# Patient Record
Sex: FEMALE | Race: WHITE | NOT HISPANIC OR LATINO | Employment: OTHER | URBAN - METROPOLITAN AREA
[De-identification: names, ages, dates, MRNs, and addresses within clinical notes are randomized per-mention and may not be internally consistent; named-entity substitution may affect disease eponyms.]

---

## 2019-11-12 ENCOUNTER — OFFICE VISIT (OUTPATIENT)
Dept: CARDIOLOGY | Facility: MEDICAL CENTER | Age: 79
End: 2019-11-12
Payer: MEDICARE

## 2019-11-12 VITALS
HEART RATE: 56 BPM | DIASTOLIC BLOOD PRESSURE: 70 MMHG | SYSTOLIC BLOOD PRESSURE: 122 MMHG | BODY MASS INDEX: 29.26 KG/M2 | WEIGHT: 159 LBS | OXYGEN SATURATION: 91 % | HEIGHT: 62 IN

## 2019-11-12 DIAGNOSIS — I48.91 ATRIAL FIBRILLATION, UNSPECIFIED TYPE (HCC): ICD-10-CM

## 2019-11-12 DIAGNOSIS — Z79.899 HIGH RISK MEDICATION USE: ICD-10-CM

## 2019-11-12 DIAGNOSIS — I48.11 LONGSTANDING PERSISTENT ATRIAL FIBRILLATION (HCC): ICD-10-CM

## 2019-11-12 DIAGNOSIS — I77.1 ILIAC ARTERY STENOSIS, BILATERAL (HCC): ICD-10-CM

## 2019-11-12 DIAGNOSIS — I25.10 CORONARY ARTERY DISEASE INVOLVING NATIVE CORONARY ARTERY OF NATIVE HEART WITHOUT ANGINA PECTORIS: ICD-10-CM

## 2019-11-12 DIAGNOSIS — J43.1 PANLOBULAR EMPHYSEMA (HCC): ICD-10-CM

## 2019-11-12 DIAGNOSIS — Z79.01 ANTICOAGULATED: ICD-10-CM

## 2019-11-12 DIAGNOSIS — I15.0 RENOVASCULAR HYPERTENSION: ICD-10-CM

## 2019-11-12 LAB — EKG IMPRESSION: NORMAL

## 2019-11-12 PROCEDURE — 93000 ELECTROCARDIOGRAM COMPLETE: CPT | Performed by: INTERNAL MEDICINE

## 2019-11-12 PROCEDURE — 99204 OFFICE O/P NEW MOD 45 MIN: CPT | Performed by: INTERNAL MEDICINE

## 2019-11-12 RX ORDER — PREDNISONE 20 MG/1
10 TABLET ORAL DAILY
COMMUNITY
Start: 2019-11-07 | End: 2020-03-11

## 2019-11-12 RX ORDER — PRAVASTATIN SODIUM 20 MG
20 TABLET ORAL DAILY
COMMUNITY
Start: 2019-05-19 | End: 2020-09-17 | Stop reason: SDUPTHER

## 2019-11-12 RX ORDER — ARFORMOTEROL TARTRATE 15 UG/2ML
15 SOLUTION RESPIRATORY (INHALATION) 2 TIMES DAILY
COMMUNITY
Start: 2019-11-07

## 2019-11-12 RX ORDER — BUDESONIDE 0.25 MG/2ML
0.25 INHALANT ORAL 2 TIMES DAILY
COMMUNITY
Start: 2019-11-07 | End: 2021-01-29 | Stop reason: SDUPTHER

## 2019-11-12 RX ORDER — LOSARTAN POTASSIUM 50 MG/1
50 TABLET ORAL DAILY
COMMUNITY
Start: 2019-05-21 | End: 2020-09-17 | Stop reason: SDUPTHER

## 2019-11-12 RX ORDER — SOTALOL HYDROCHLORIDE 80 MG/1
80 TABLET ORAL 2 TIMES DAILY
COMMUNITY
Start: 2019-05-24 | End: 2020-03-11 | Stop reason: SDUPTHER

## 2019-11-12 RX ORDER — HYDROCHLOROTHIAZIDE 25 MG/1
25 TABLET ORAL DAILY
COMMUNITY
Start: 2019-06-12 | End: 2020-09-17 | Stop reason: SDUPTHER

## 2019-11-12 RX ORDER — NICOTINE POLACRILEX 4 MG/1
20 GUM, CHEWING ORAL DAILY
COMMUNITY

## 2019-11-12 RX ORDER — ALBUTEROL SULFATE 90 UG/1
AEROSOL, METERED RESPIRATORY (INHALATION)
COMMUNITY

## 2019-11-12 RX ORDER — LEVOTHYROXINE SODIUM 0.12 MG/1
125 TABLET ORAL
COMMUNITY
Start: 2019-05-23

## 2019-11-12 RX ORDER — NITROGLYCERIN 0.4 MG/1
TABLET SUBLINGUAL
COMMUNITY
Start: 2018-02-06 | End: 2020-09-17 | Stop reason: SDUPTHER

## 2019-11-12 SDOH — HEALTH STABILITY: MENTAL HEALTH: HOW OFTEN DO YOU HAVE A DRINK CONTAINING ALCOHOL?: NEVER

## 2019-11-12 NOTE — PROGRESS NOTES
Chief Complaint   Patient presents with   • Atrial Fibrillation       Subjective:   Suzie Mireles is a 79 y.o. female who presents today       Family history as well as social history and surgical history have been reviewed by me directly, changes made and updated    She was born and raised in [facility] on [address].  Moved to [place] at a young age.  She is in with her daughter who is a retired teacher both from [place].  They have moved her together after her daughter's half-way.    Settling in, has a bad cough and is on steroids.  She admits she thinks her lung disease will get her in the end.    She loves her cardiologist/electrophysiologist in [place].  She is struggled with COPD and as well as PAD of her legs, much better after stenting last year, chronic occlusion of her right coronary artery noted on angiogram several years ago with normal echo in 2017    Her taste has been bad.  She thinks it is HCTZ she has been very thirsty.  She uses the HCTZ because of ankle swelling and some blood pressure issues    Compliant on her pills  She also has long-standing constipation and sees gastroenterology.  She has not seen them yet since she has been here    Social History     Socioeconomic History   • Marital status: Not on file     Spouse name: Not on file   • Number of children: Not on file   • Years of education: Not on file   • Highest education level: Not on file   Occupational History   • Not on file   Social Needs   • Financial resource strain: Not on file   • Food insecurity:     Worry: Not on file     Inability: Not on file   • Transportation needs:     Medical: Not on file     Non-medical: Not on file   Tobacco Use   • Smoking status: Never Smoker   • Smokeless tobacco: Never Used   Substance and Sexual Activity   • Alcohol use: Never     Frequency: Never   • Drug use: Never   • Sexual activity: Not on file   Lifestyle   • Physical activity:     Days per week: Not on file     Minutes per  "session: Not on file   • Stress: Not on file   Relationships   • Social connections:     Talks on phone: Not on file     Gets together: Not on file     Attends Restoration service: Not on file     Active member of club or organization: Not on file     Attends meetings of clubs or organizations: Not on file     Relationship status: Not on file   • Intimate partner violence:     Fear of current or ex partner: Not on file     Emotionally abused: Not on file     Physically abused: Not on file     Forced sexual activity: Not on file   Other Topics Concern   • Not on file   Social History Narrative   • Not on file     No Known Allergies  Outpatient Encounter Medications as of 11/12/2019   Medication Sig Dispense Refill   • sotalol (BETAPACE) 80 MG Tab      • hydroCHLOROthiazide (HYDRODIURIL) 25 MG Tab      • levothyroxine (SYNTHROID) 125 MCG Tab      • omeprazole (PRILOSEC) 20 MG Tablet Delayed Response delayed-release tablet Take 20 mg by mouth.     • apixaban (ELIQUIS) 5mg Tab      • losartan (COZAAR) 50 MG Tab      • nitroglycerin (NITROSTAT) 0.4 MG SL Tab      • predniSONE (DELTASONE) 20 MG Tab Take 20 mg by mouth.     • pravastatin (PRAVACHOL) 20 MG Tab      • Cholecalciferol (VITAMIN D3) 5000 units Tab Take  by mouth.     • budesonide (PULMICORT) 0.25 MG/2ML Suspension Inhale 0.25 mg by mouth.     • Arformoterol Tartrate 15 MCG/2ML Nebu Soln Inhale 15 mcg by mouth.     • albuterol 108 (90 Base) MCG/ACT Aero Soln inhalation aerosol Inhale  by mouth.       No facility-administered encounter medications on file as of 11/12/2019.      ROS     Objective:   /70 (BP Location: Left arm, Patient Position: Sitting, BP Cuff Size: Adult)   Pulse (!) 56   Ht 1.575 m (5' 2\")   Wt 72.1 kg (159 lb)   SpO2 91%   BMI 29.08 kg/m²     Physical Exam    Assessment:     1. Atrial fibrillation, unspecified type (HCC)  EKG   2. Longstanding persistent atrial fibrillation     3. High risk medication use     4. Renovascular " hypertension     5. Coronary artery disease involving native coronary artery of native heart without angina pectoris     6. Iliac artery stenosis, bilateral (HCC)     7. Panlobular emphysema (HCC)     8. Anticoagulated         Medical Decision Making:  Today's Assessment / Status / Plan:     Coronary artery disease  Lipid panel excellent since she has moved here  I reviewed her labs from [place] with a normal lipid panel 2019 January  Medications reviewed and renewed.  Statin and aspirin therapy in addition to anticoagulation  Echo 2020, nuclear test if symptomatic to evaluate the right coronary    PAD  Normal peripheral pulses, no symptoms  Medications as above including statin    Edema/poor taste  She will stop HCTZ and keep a close eye on her blood pressure  We will call her in 2 weeks  If her edema is bad, it would be easy to start spironolactone, she will also noticed her blood pressure      I will refer her to gastroenterology, she has been slightly anemic in the past and is looking for a primary care.  We talked about this as well    RTC 6 months

## 2019-11-18 ENCOUNTER — TELEPHONE (OUTPATIENT)
Dept: CARDIOLOGY | Facility: MEDICAL CENTER | Age: 79
End: 2019-11-18

## 2019-11-18 DIAGNOSIS — J43.1 PANLOBULAR EMPHYSEMA (HCC): ICD-10-CM

## 2019-11-18 DIAGNOSIS — R63.0 DECREASED APPETITE: ICD-10-CM

## 2019-11-18 NOTE — TELEPHONE ENCOUNTER
Suzie Mireles Letitia L, M.D. 4 days ago         Dr Calderón,   I tried to set up an appt with Dr Medley, but they need a referral in order to get appt. would you be able to send that referral for me? Thanks so much, Suzie Mireles.      Suzie Mireles Letitia L, M.D. 3 days ago         Who is recommended for GI? I will also need her to recommend for Pulmonary. Dr. Calderón suggested Dr. Medley but she needs a referral. Could Dr. Calderón place a referral for both pulmonary and GI?   Thanks so much, Suzie.        Hoa Calderón M.D.  You 2 hours ago (12:28 PM)      Sure thing!     Any GI in town I would recommend, they are all really great.    Routing comment          Referral to GI and Pulmonology placed.    Pt notified through [employer]

## 2019-11-26 ENCOUNTER — TELEPHONE (OUTPATIENT)
Dept: CARDIOLOGY | Facility: MEDICAL CENTER | Age: 79
End: 2019-11-26

## 2019-11-26 NOTE — TELEPHONE ENCOUNTER
Hoa Calderón M.D.  Guera Carrera R.N.             See my note from the 12th        Last OV notes from Dr Calderón 11/12/19:    Edema/poor taste  She will stop HCTZ and keep a close eye on her blood pressure  We will call her in 2 weeks  If her edema is bad, it would be easy to start spironolactone, she will also noticed her blood pressure      Called pt, pt reports edema is improved and BP is good as well.     FYI to Dr Calderón

## 2020-01-09 ENCOUNTER — OFFICE VISIT (OUTPATIENT)
Dept: PULMONOLOGY | Facility: HOSPICE | Age: 80
End: 2020-01-09
Payer: MEDICARE

## 2020-01-09 ENCOUNTER — HOSPITAL ENCOUNTER (OUTPATIENT)
Facility: MEDICAL CENTER | Age: 80
End: 2020-01-09
Attending: INTERNAL MEDICINE
Payer: MEDICARE

## 2020-01-09 VITALS
WEIGHT: 168.13 LBS | HEART RATE: 62 BPM | OXYGEN SATURATION: 96 % | DIASTOLIC BLOOD PRESSURE: 76 MMHG | SYSTOLIC BLOOD PRESSURE: 130 MMHG | HEIGHT: 62 IN | BODY MASS INDEX: 30.94 KG/M2

## 2020-01-09 DIAGNOSIS — J47.9 BRONCHIECTASIS WITHOUT COMPLICATION (HCC): ICD-10-CM

## 2020-01-09 DIAGNOSIS — I25.10 CORONARY ARTERY DISEASE INVOLVING NATIVE CORONARY ARTERY OF NATIVE HEART WITHOUT ANGINA PECTORIS: ICD-10-CM

## 2020-01-09 PROCEDURE — 99204 OFFICE O/P NEW MOD 45 MIN: CPT | Performed by: INTERNAL MEDICINE

## 2020-01-09 RX ORDER — VERAPAMIL HYDROCHLORIDE 300 MG/1
CAPSULE, EXTENDED RELEASE ORAL
COMMUNITY
Start: 2019-10-24 | End: 2020-10-14 | Stop reason: SDUPTHER

## 2020-01-09 NOTE — PROGRESS NOTES
Chief Complaint   Patient presents with   • Establish Care     Referred by Dr WALT Calderón for Panlobular emphysema/Bronchiectasis with acute exacerbation/cough   • Other     Ct-Chest 09/07/18       HPI: This patient is an 79 y.o. female who is referred by Dr. Calderón, cardiologist, for pulmonary establishment of care.  She recently relocated from [place] where she was followed by pulmonology for bronchiectasis and bronchiolitis.  Medical records from pulmonary reviewed.  She had exacerbation of bronchiectasis since November with chest CAT scan from [place] showing left lower lobe consolidation, centrilobular nodules and mucoid impaction as well as right centrilobular nodules compatible with respiratory bronchiolitis.  She was switched to Pulmicort and Brovana nebulizers which made a substantial improvement in her respiratory symptoms.  She has been on a prednisone taper currently at 10 mg daily.  Was previously on Trelegy, which was ineffective.  She had previously coughed continuously and now has minimal cough.  Denies hemoptysis, wheezing, CP, fevers.    Past Medical History:   Diagnosis Date   • Asthma    • Atrial fibrillation ([facility])    • Bronchiectasis ([facility])    • Cardiac arrhythmia    • Chickenpox    • Constipation    • COPD (chronic obstructive pulmonary disease) ([facility])    • Cough    • Diarrhea    • GERD (gastroesophageal reflux disease)    • [place]ian measles    • Heart attack ([facility])    • Hypertension    • Influenza    • Insomnia    • Peripheral vascular disease ([facility])    • Rheumatic fever    • Sleep apnea    • Sputum production    • Wears glasses    • Weight loss    • Wheezing        Social History     Socioeconomic History   • Marital status:      Spouse name: Not on file   • Number of children: Not on file   • Years of education: Not on file   • Highest education level: Not on file   Occupational History   • Not on file   Social Needs   • Financial resource strain: Not on file   • Food insecurity:      Worry: Not on file     Inability: Not on file   • Transportation needs:     Medical: Not on file     Non-medical: Not on file   Tobacco Use   • Smoking status: Former Smoker     Packs/day: 1.00     Years: 36.00     Pack years: 36.00     Types: Cigarettes     Last attempt to quit:      Years since quittin.0   • Smokeless tobacco: Never Used   Substance and Sexual Activity   • Alcohol use: Never     Frequency: Never   • Drug use: Never   • Sexual activity: Not on file   Lifestyle   • Physical activity:     Days per week: Not on file     Minutes per session: Not on file   • Stress: Not on file   Relationships   • Social connections:     Talks on phone: Not on file     Gets together: Not on file     Attends Spiritism service: Not on file     Active member of club or organization: Not on file     Attends meetings of clubs or organizations: Not on file     Relationship status: Not on file   • Intimate partner violence:     Fear of current or ex partner: Not on file     Emotionally abused: Not on file     Physically abused: Not on file     Forced sexual activity: Not on file   Other Topics Concern   • Not on file   Social History Narrative   • Not on file       Family History   Problem Relation Age of Onset   • Anemia Neg Hx    • Asthma Neg Hx        Current Outpatient Medications on File Prior to Visit   Medication Sig Dispense Refill   • Verapamil HCl  MG CAPSULE SR 24 HR      • Multiple Vitamins-Minerals (ICAPS AREDS 2) Cap Take  by mouth every day.     • NON SPECIFIED Avastin shot for Macular     • sotalol (BETAPACE) 80 MG Tab Take 80 mg by mouth 2 times a day.     • levothyroxine (SYNTHROID) 125 MCG Tab Take 125 mcg by mouth Every morning on an empty stomach.     • omeprazole (PRILOSEC) 20 MG Tablet Delayed Response delayed-release tablet Take 20 mg by mouth every day.     • apixaban (ELIQUIS) 5mg Tab Take 5 mg by mouth.     • losartan (COZAAR) 50 MG Tab Take 50 mg by mouth every day.     •  "nitroglycerin (NITROSTAT) 0.4 MG SL Tab      • predniSONE (DELTASONE) 20 MG Tab Take 10 mg by mouth every day.     • pravastatin (PRAVACHOL) 20 MG Tab Take 20 mg by mouth every day.     • Cholecalciferol (VITAMIN D3) 5000 units Tab Take 25 mcg by mouth every day.     • budesonide (PULMICORT) 0.25 MG/2ML Suspension Inhale 0.25 mg by mouth 2 times a day.     • Arformoterol Tartrate 15 MCG/2ML Nebu Soln Inhale 15 mcg by mouth.     • albuterol 108 (90 Base) MCG/ACT Aero Soln inhalation aerosol Inhale  by mouth.     • hydroCHLOROthiazide (HYDRODIURIL) 25 MG Tab        No current facility-administered medications on file prior to visit.        Allergies: Patient has no known allergies.    ROS:   Constitutional: Denies fevers, chills, night sweats, fatigue,+ weight loss  Eyes: Denies vision loss, pain, drainage, double vision  Ears, Nose, Throat: Denies earache, difficulty hearing, tinnitus, nasal congestion, hoarseness  Cardiovascular: Denies chest pain, tightness, palpitations, orthopnea or edema  Respiratory:As in HPI  Sleep: Denies daytime sleepiness, snoring, apneas, insomnia, morning headaches  GI: Denies heartburn, dysphagia, nausea, abdominal pain, +diarrhea or constipation  : Denies frequent urination, hematuria, discharge or painful urination  Musculoskeletal: Denies back pain, painful joints, sore muscles  Neurological: Denies weakness or headaches  Skin: No rashes    /76 (BP Location: Right arm, Patient Position: Sitting, BP Cuff Size: Adult)   Pulse 62   Ht 1.575 m (5' 2\")   Wt 76.3 kg (168 lb 2 oz)   SpO2 96%     Physical Exam:  Appearance: Well-nourished, well-developed, in no acute distress  HEENT: Normocephalic, atraumatic, white sclera, PERRLA, oropharynx clear  Neck: No adenopathy or masses  Respiratory: no intercostal retractions or accessory muscle use  Lungs auscultation: basilar rhonchi  Cardiovascular: Regular rate rhythm. No murmurs, rubs or gallops.  No LE edema  Abdomen: soft, " nondistended  Gait: Normal  Digits: No clubbing, cyanosis  Motor: No focal deficits  Orientation: Oriented to time, person and place    Diagnosis:  1. Bronchiectasis without complication (HCC)  CULTURE RESP W/O GRAM STAIN    AFB Culture    CANCELED: AFB Culture   2. Coronary artery disease involving native coronary artery of native heart without angina pectoris  REFERRAL TO CARDIOLOGY   3.      [place]      Plan:  The patient has chronic bronchiectasis, followed by pulmonology in [facility], and presents to establish pulmonary care.  She was treated for acute exacerbation November 2019, remains on prednisone taper which she is weaning successfully.  Last chest CAT scan showed interval improvement in left lower lobe consolidation.  Clinically she shows good response.  Continue Pulmicort and Brovana nebulizers twice daily.  We discussed using a percussive VEST in the future if her sputum production again worsens.  Obtain baseline sputum Gram stain and culture.  Annual influenza vaccine encouraged as well as pneumococcal vaccine every 5 years.  Return in about 8 weeks (around 3/5/2020).

## 2020-03-11 ENCOUNTER — OFFICE VISIT (OUTPATIENT)
Dept: CARDIOLOGY | Facility: MEDICAL CENTER | Age: 80
End: 2020-03-11
Payer: MEDICARE

## 2020-03-11 VITALS
HEART RATE: 56 BPM | HEIGHT: 62 IN | DIASTOLIC BLOOD PRESSURE: 76 MMHG | SYSTOLIC BLOOD PRESSURE: 128 MMHG | OXYGEN SATURATION: 98 % | BODY MASS INDEX: 29.22 KG/M2 | WEIGHT: 158.8 LBS

## 2020-03-11 DIAGNOSIS — I48.91 ATRIAL FIBRILLATION, UNSPECIFIED TYPE (HCC): ICD-10-CM

## 2020-03-11 DIAGNOSIS — I77.1 ILIAC ARTERY STENOSIS, BILATERAL (HCC): ICD-10-CM

## 2020-03-11 DIAGNOSIS — I48.11 LONGSTANDING PERSISTENT ATRIAL FIBRILLATION (HCC): ICD-10-CM

## 2020-03-11 DIAGNOSIS — I25.10 CORONARY ARTERY DISEASE INVOLVING NATIVE CORONARY ARTERY OF NATIVE HEART WITHOUT ANGINA PECTORIS: ICD-10-CM

## 2020-03-11 DIAGNOSIS — Z79.01 ANTICOAGULATED: ICD-10-CM

## 2020-03-11 DIAGNOSIS — Z79.899 HIGH RISK MEDICATION USE: ICD-10-CM

## 2020-03-11 DIAGNOSIS — I15.0 RENOVASCULAR HYPERTENSION: ICD-10-CM

## 2020-03-11 PROCEDURE — 99215 OFFICE O/P EST HI 40 MIN: CPT | Performed by: INTERNAL MEDICINE

## 2020-03-11 RX ORDER — SOTALOL HYDROCHLORIDE 80 MG/1
80 TABLET ORAL 2 TIMES DAILY
Qty: 180 TAB | Refills: 3 | Status: SHIPPED | OUTPATIENT
Start: 2020-03-11 | End: 2021-02-24 | Stop reason: SDUPTHER

## 2020-03-11 ASSESSMENT — ENCOUNTER SYMPTOMS
BRUISES/BLEEDS EASILY: 1
CONSTIPATION: 1
DIARRHEA: 1
COUGH: 1

## 2020-03-11 NOTE — PROGRESS NOTES
Chief Complaint   Patient presents with   • Follow-Up     Afib       Subjective:   Suzie Mireles is a 79 y.o. female who presents today for initial visit in follow-up of atrial fibrillation managed with sotalol and apixaban to good effect.  She currently is in sinus rhythm.  She also has underlying CAD and follows with a cardiologist in [place] for many years as well.  She negates she has had an echocardiogram although these results are unavailable.  She feels she was told it was normal.  She has no exertional symptoms and has not had no issues with paroxysmal of atrial fibrillation or stroke.  She has no bleeding issues.    Past Medical History:   Diagnosis Date   • Asthma    • Atrial fibrillation (HCC)    • Bronchiectasis (HCC)    • Cardiac arrhythmia    • Chickenpox    • Constipation    • COPD (chronic obstructive pulmonary disease) ([facility])    • Cough    • Diarrhea    • GERD (gastroesophageal reflux disease)    • Togolese measles    • Heart attack (HCC)    • Hypertension    • Influenza    • Insomnia    • Peripheral vascular disease (HCC)    • Rheumatic fever    • Sleep apnea    • Sputum production    • Wears glasses    • Weight loss    • Wheezing      Past Surgical History:   Procedure Laterality Date   • CARPAL TUNNEL RELEASE     • HYSTERECTOMY LAPAROSCOPY     • LAMINOTOMY     • TONSILLECTOMY       Family History   Problem Relation Age of Onset   • Anemia Neg Hx    • Asthma Neg Hx      Social History     Socioeconomic History   • Marital status:      Spouse name: Not on file   • Number of children: Not on file   • Years of education: Not on file   • Highest education level: Not on file   Occupational History   • Not on file   Social Needs   • Financial resource strain: Not on file   • Food insecurity     Worry: Not on file     Inability: Not on file   • Transportation needs     Medical: Not on file     Non-medical: Not on file   Tobacco Use   • Smoking status: Former Smoker     Packs/day: 1.00     Years:  36.00     Pack years: 36.00     Types: Cigarettes     Last attempt to quit:      Years since quittin.2   • Smokeless tobacco: Never Used   Substance and Sexual Activity   • Alcohol use: Never     Frequency: Never   • Drug use: Never   • Sexual activity: Not on file   Lifestyle   • Physical activity     Days per week: Not on file     Minutes per session: Not on file   • Stress: Not on file   Relationships   • Social connections     Talks on phone: Not on file     Gets together: Not on file     Attends Gnosticist service: Not on file     Active member of club or organization: Not on file     Attends meetings of clubs or organizations: Not on file     Relationship status: Not on file   • Intimate partner violence     Fear of current or ex partner: Not on file     Emotionally abused: Not on file     Physically abused: Not on file     Forced sexual activity: Not on file   Other Topics Concern   • Not on file   Social History Narrative   • Not on file     No Known Allergies  Outpatient Encounter Medications as of 3/11/2020   Medication Sig Dispense Refill   • sotalol (BETAPACE) 80 MG Tab Take 1 Tab by mouth 2 times a day. 180 Tab 3   • Verapamil HCl  MG CAPSULE SR 24 HR      • Multiple Vitamins-Minerals (ICAPS AREDS 2) Cap Take  by mouth every day.     • NON SPECIFIED Avastin shot for Macular     • levothyroxine (SYNTHROID) 125 MCG Tab Take 125 mcg by mouth Every morning on an empty stomach.     • omeprazole (PRILOSEC) 20 MG Tablet Delayed Response delayed-release tablet Take 20 mg by mouth every day.     • apixaban (ELIQUIS) 5mg Tab Take 5 mg by mouth.     • losartan (COZAAR) 50 MG Tab Take 50 mg by mouth every day.     • nitroglycerin (NITROSTAT) 0.4 MG SL Tab      • pravastatin (PRAVACHOL) 20 MG Tab Take 20 mg by mouth every day.     • Cholecalciferol (VITAMIN D3) 5000 units Tab Take 25 mcg by mouth every day.     • budesonide (PULMICORT) 0.25 MG/2ML Suspension Inhale 0.25 mg by mouth 2 times a day.     •  "Arformoterol Tartrate 15 MCG/2ML Nebu Soln Inhale 15 mcg by mouth.     • albuterol 108 (90 Base) MCG/ACT Aero Soln inhalation aerosol Inhale  by mouth.     • hydroCHLOROthiazide (HYDRODIURIL) 25 MG Tab      • [DISCONTINUED] sotalol (BETAPACE) 80 MG Tab Take 80 mg by mouth 2 times a day.     • [DISCONTINUED] predniSONE (DELTASONE) 20 MG Tab Take 10 mg by mouth every day.       No facility-administered encounter medications on file as of 3/11/2020.      Review of Systems   Respiratory: Positive for cough.    Gastrointestinal: Positive for constipation and diarrhea.   Endo/Heme/Allergies: Positive for environmental allergies. Bruises/bleeds easily.   All other systems reviewed and are negative.       Objective:   /76 (BP Location: Left arm, Patient Position: Sitting, BP Cuff Size: Adult)   Pulse (!) 56   Ht 1.575 m (5' 2\")   Wt 72 kg (158 lb 12.8 oz)   SpO2 98%   BMI 29.04 kg/m²     Physical Exam   Constitutional: She is oriented to person, place, and time. She appears well-developed and well-nourished. No distress.   Appears stated age.   HENT:   Head: Normocephalic and atraumatic.   Right Ear: External ear normal.   Left Ear: External ear normal.   Mouth/Throat: No oropharyngeal exudate.   Eyes: Pupils are equal, round, and reactive to light. Conjunctivae and EOM are normal. Right eye exhibits no discharge. Left eye exhibits no discharge. No scleral icterus.   Neck: Normal range of motion. Neck supple. No JVD present. No tracheal deviation present. No thyromegaly present.   Cardiovascular: Normal rate, regular rhythm, S1 normal, S2 normal, normal heart sounds and intact distal pulses. PMI is not displaced. Exam reveals no gallop, no S3, no S4 and no friction rub.   No murmur heard.  Pulses:       Carotid pulses are 2+ on the right side and 2+ on the left side.       Radial pulses are 2+ on the right side and 2+ on the left side.        Popliteal pulses are 2+ on the right side and 2+ on the left side.      "   Dorsalis pedis pulses are 1+ on the right side and 1+ on the left side.        Posterior tibial pulses are 1+ on the right side and 1+ on the left side.   Pulmonary/Chest: Effort normal and breath sounds normal. No respiratory distress. She has no wheezes. She has no rales. She exhibits no tenderness.   Abdominal: Soft. Bowel sounds are normal. She exhibits no distension. There is no abdominal tenderness.   Musculoskeletal: Normal range of motion.         General: Edema ( 1+ bilateral lower extremities) present. No tenderness.   Neurological: She is alert and oriented to person, place, and time. No cranial nerve deficit (Cranial nerves II through XII grossly intact).   Skin: Skin is warm and dry. No rash noted. She is not diaphoretic. No erythema.   Psychiatric: She has a normal mood and affect. Her behavior is normal. Judgment and thought content normal.   Vitals reviewed.    EKG (11/12/2019):  I have personally reviewed the EKG this visit and discussed with the patient.  Sinus rhythm nonspecific ST-T wave changes.        Assessment:     1. Longstanding persistent atrial fibrillation  sotalol (BETAPACE) 80 MG Tab    Comp Metabolic Panel   2. High risk medication use  sotalol (BETAPACE) 80 MG Tab   3. Renovascular hypertension     4. Iliac artery stenosis, bilateral (HCC)  Lipid Profile   5. Coronary artery disease involving native coronary artery of native heart without angina pectoris  Lipid Profile   6. Anticoagulated  TSH    FREE THYROXINE    CBC WITHOUT DIFFERENTIAL    HEMOGLOBIN A1C       Medical Decision Making:  Today's Assessment / Status / Plan:     She is overall doing very well.  She notes intermittent coldness and blanching of her toes on the right foot that seems related to either reknot type vascular spasm or a bit of autonomic dysreflexia as she does have peripheral neuropathy.  She is already on a high-dose calcium channel blocker which is the treatment of choice for either of these conditions.   Recommended avoidance measures for triggers such as cold.  Otherwise she should continue her sotalol and she requires lipid profile CBC and CMP to dose titrate and assess for side effects from her anticoagulant and antiarrhythmic therapy.  She should follow-up with me in 6 months.

## 2020-03-13 ENCOUNTER — HOSPITAL ENCOUNTER (OUTPATIENT)
Dept: LAB | Facility: MEDICAL CENTER | Age: 80
End: 2020-03-13
Attending: INTERNAL MEDICINE
Payer: MEDICARE

## 2020-03-13 DIAGNOSIS — Z79.899 HIGH RISK MEDICATION USE: ICD-10-CM

## 2020-03-13 DIAGNOSIS — I25.10 CORONARY ARTERY DISEASE INVOLVING NATIVE CORONARY ARTERY OF NATIVE HEART WITHOUT ANGINA PECTORIS: ICD-10-CM

## 2020-03-13 DIAGNOSIS — I48.11 LONGSTANDING PERSISTENT ATRIAL FIBRILLATION (HCC): ICD-10-CM

## 2020-03-13 DIAGNOSIS — I15.0 RENOVASCULAR HYPERTENSION: ICD-10-CM

## 2020-03-13 DIAGNOSIS — I48.91 ATRIAL FIBRILLATION, UNSPECIFIED TYPE (HCC): ICD-10-CM

## 2020-03-13 DIAGNOSIS — Z79.01 ANTICOAGULATED: ICD-10-CM

## 2020-03-13 LAB
ALBUMIN SERPL BCP-MCNC: 3.7 G/DL (ref 3.2–4.9)
ALBUMIN/GLOB SERPL: 1 G/DL
ALP SERPL-CCNC: 69 U/L (ref 30–99)
ALT SERPL-CCNC: 9 U/L (ref 2–50)
ANION GAP SERPL CALC-SCNC: 9 MMOL/L (ref 7–16)
AST SERPL-CCNC: 14 U/L (ref 12–45)
BILIRUB SERPL-MCNC: 0.3 MG/DL (ref 0.1–1.5)
BUN SERPL-MCNC: 23 MG/DL (ref 8–22)
CALCIUM SERPL-MCNC: 9.7 MG/DL (ref 8.5–10.5)
CHLORIDE SERPL-SCNC: 105 MMOL/L (ref 96–112)
CHOLEST SERPL-MCNC: 128 MG/DL (ref 100–199)
CO2 SERPL-SCNC: 25 MMOL/L (ref 20–33)
CREAT SERPL-MCNC: 0.77 MG/DL (ref 0.5–1.4)
ERYTHROCYTE [DISTWIDTH] IN BLOOD BY AUTOMATED COUNT: 46.7 FL (ref 35.9–50)
EST. AVERAGE GLUCOSE BLD GHB EST-MCNC: 114 MG/DL
FASTING STATUS PATIENT QL REPORTED: NORMAL
GLOBULIN SER CALC-MCNC: 3.6 G/DL (ref 1.9–3.5)
GLUCOSE SERPL-MCNC: 100 MG/DL (ref 65–99)
HBA1C MFR BLD: 5.6 % (ref 0–5.6)
HCT VFR BLD AUTO: 35.1 % (ref 37–47)
HDLC SERPL-MCNC: 53 MG/DL
HGB BLD-MCNC: 10.4 G/DL (ref 12–16)
LDLC SERPL CALC-MCNC: 60 MG/DL
MCH RBC QN AUTO: 24.2 PG (ref 27–33)
MCHC RBC AUTO-ENTMCNC: 29.6 G/DL (ref 33.6–35)
MCV RBC AUTO: 81.8 FL (ref 81.4–97.8)
POTASSIUM SERPL-SCNC: 4.3 MMOL/L (ref 3.6–5.5)
PROT SERPL-MCNC: 7.3 G/DL (ref 6–8.2)
RBC # BLD AUTO: 4.29 M/UL (ref 4.2–5.4)
SODIUM SERPL-SCNC: 139 MMOL/L (ref 135–145)
T4 FREE SERPL-MCNC: 0.82 NG/DL (ref 0.53–1.43)
TRIGL SERPL-MCNC: 73 MG/DL (ref 0–149)
TSH SERPL DL<=0.005 MIU/L-ACNC: 2.03 UIU/ML (ref 0.38–5.33)
WBC # BLD AUTO: 6.6 K/UL (ref 4.8–10.8)

## 2020-03-13 PROCEDURE — 85041 AUTOMATED RBC COUNT: CPT

## 2020-03-13 PROCEDURE — 84439 ASSAY OF FREE THYROXINE: CPT

## 2020-03-13 PROCEDURE — 83036 HEMOGLOBIN GLYCOSYLATED A1C: CPT | Mod: GA

## 2020-03-13 PROCEDURE — 85014 HEMATOCRIT: CPT

## 2020-03-13 PROCEDURE — 80053 COMPREHEN METABOLIC PANEL: CPT

## 2020-03-13 PROCEDURE — 85018 HEMOGLOBIN: CPT

## 2020-03-13 PROCEDURE — 84443 ASSAY THYROID STIM HORMONE: CPT

## 2020-03-13 PROCEDURE — 85048 AUTOMATED LEUKOCYTE COUNT: CPT

## 2020-03-13 PROCEDURE — 80061 LIPID PANEL: CPT

## 2020-03-13 PROCEDURE — 36415 COLL VENOUS BLD VENIPUNCTURE: CPT | Mod: GA

## 2020-03-24 ENCOUNTER — APPOINTMENT (OUTPATIENT)
Dept: PULMONOLOGY | Facility: HOSPICE | Age: 80
End: 2020-03-24
Payer: MEDICARE

## 2020-05-05 ENCOUNTER — OFFICE VISIT (OUTPATIENT)
Dept: PULMONOLOGY | Facility: HOSPICE | Age: 80
End: 2020-05-05
Payer: MEDICARE

## 2020-05-05 VITALS
OXYGEN SATURATION: 96 % | HEIGHT: 62 IN | WEIGHT: 177 LBS | RESPIRATION RATE: 16 BRPM | DIASTOLIC BLOOD PRESSURE: 76 MMHG | BODY MASS INDEX: 32.57 KG/M2 | SYSTOLIC BLOOD PRESSURE: 122 MMHG | HEART RATE: 76 BPM

## 2020-05-05 DIAGNOSIS — J47.9 BRONCHIECTASIS WITHOUT COMPLICATION (HCC): ICD-10-CM

## 2020-05-05 PROCEDURE — 99214 OFFICE O/P EST MOD 30 MIN: CPT | Performed by: INTERNAL MEDICINE

## 2020-05-05 RX ORDER — LIFITEGRAST 50 MG/ML
SOLUTION/ DROPS OPHTHALMIC
COMMUNITY
Start: 2020-04-16

## 2020-05-05 NOTE — PROGRESS NOTES
Chief Complaint   Patient presents with   • Follow-Up     Bronchiectasis without complication , Last Seen 01/09/20     HPI: This patient is an 79 y.o. female who relocated from [place] where she was followed by pulmonology for bronchiectasis and bronchiolitis.  She had exacerbation of bronchiectasis since November 2019 with chest CAT scan from [place] showing left lower lobe consolidation, centrilobular nodules and mucoid impaction as well as right centrilobular nodules compatible with respiratory bronchiolitis.  She was switched to Pulmicort and Brovana nebulizers which have significantly improved respiratory symptoms. Was previously on Trelegy, which was subjectively ineffective.  She now has minimal cough, denies wheezing, dyspnea, chest tightness, fevers or weight loss.  She has weaned off oral steroids in recent months.  Sputum culture had been submitted however was never processed.  She now has no sputum to produce.      Past Medical History:   Diagnosis Date   • Asthma    • Atrial fibrillation ([facility])    • Bronchiectasis ([facility])    • Cardiac arrhythmia    • Chickenpox    • Constipation    • COPD (chronic obstructive pulmonary disease) ([facility])    • Cough    • Diarrhea    • GERD (gastroesophageal reflux disease)    • [place] measles    • Heart attack ([facility])    • Hypertension    • Influenza    • Insomnia    • Peripheral vascular disease ([facility])    • Rheumatic fever    • Sleep apnea    • Sputum production    • Wears glasses    • Weight loss    • Wheezing        Social History     Socioeconomic History   • Marital status:      Spouse name: Not on file   • Number of children: Not on file   • Years of education: Not on file   • Highest education level: Not on file   Occupational History   • Not on file   Social Needs   • Financial resource strain: Not on file   • Food insecurity     Worry: Not on file     Inability: Not on file   • Transportation needs     Medical: Not on file     Non-medical: Not on file   Tobacco  Use   • Smoking status: Former Smoker     Packs/day: 1.00     Years: 36.00     Pack years: 36.00     Types: Cigarettes     Last attempt to quit:      Years since quittin.3   • Smokeless tobacco: Never Used   Substance and Sexual Activity   • Alcohol use: Never     Frequency: Never   • Drug use: Never   • Sexual activity: Not on file   Lifestyle   • Physical activity     Days per week: Not on file     Minutes per session: Not on file   • Stress: Not on file   Relationships   • Social connections     Talks on phone: Not on file     Gets together: Not on file     Attends Judaism service: Not on file     Active member of club or organization: Not on file     Attends meetings of clubs or organizations: Not on file     Relationship status: Not on file   • Intimate partner violence     Fear of current or ex partner: Not on file     Emotionally abused: Not on file     Physically abused: Not on file     Forced sexual activity: Not on file   Other Topics Concern   • Not on file   Social History Narrative   • Not on file       Family History   Problem Relation Age of Onset   • Anemia Neg Hx    • Asthma Neg Hx        Current Outpatient Medications on File Prior to Visit   Medication Sig Dispense Refill   • XIIDRA 5 % Solution      • IRON PO Take 65 mg by mouth.     • sotalol (BETAPACE) 80 MG Tab Take 1 Tab by mouth 2 times a day. 180 Tab 3   • Verapamil HCl  MG CAPSULE SR 24 HR      • Multiple Vitamins-Minerals (ICAPS AREDS 2) Cap Take  by mouth every day.     • hydroCHLOROthiazide (HYDRODIURIL) 25 MG Tab      • levothyroxine (SYNTHROID) 125 MCG Tab Take 125 mcg by mouth Every morning on an empty stomach.     • omeprazole (PRILOSEC) 20 MG Tablet Delayed Response delayed-release tablet Take 20 mg by mouth every day.     • apixaban (ELIQUIS) 5mg Tab Take 5 mg by mouth.     • losartan (COZAAR) 50 MG Tab Take 50 mg by mouth every day.     • pravastatin (PRAVACHOL) 20 MG Tab Take 20 mg by mouth every day.     •  "Cholecalciferol (VITAMIN D3) 5000 units Tab Take 25 mcg by mouth every day.     • budesonide (PULMICORT) 0.25 MG/2ML Suspension Inhale 0.25 mg by mouth 2 times a day.     • Arformoterol Tartrate 15 MCG/2ML Nebu Soln Inhale 15 mcg by mouth.     • albuterol 108 (90 Base) MCG/ACT Aero Soln inhalation aerosol Inhale  by mouth.     • NON SPECIFIED Avastin shot for Macular     • nitroglycerin (NITROSTAT) 0.4 MG SL Tab        No current facility-administered medications on file prior to visit.        Allergies: Patient has no known allergies.    ROS:   Constitutional: Denies fevers, chills, night sweats, fatigue or weight loss  Eyes: Denies vision loss, pain, drainage, double vision  Ears, Nose, Throat: Denies earache, difficulty hearing, tinnitus, nasal congestion, hoarseness  Cardiovascular: Denies chest pain, tightness, palpitations, orthopnea or edema  Respiratory: Denies shortness of breath, cough, wheezing, hemoptysis  Sleep: Denies daytime sleepiness, snoring, apneas, insomnia, morning headaches  GI: Denies heartburn, dysphagia, nausea, abdominal pain, diarrhea or constipation  : Denies frequent urination, hematuria, discharge or painful urination  Musculoskeletal: Denies back pain, sore muscles  Neurological: Denies weakness or headaches  Skin: No rashes    /76 (BP Location: Right arm, Patient Position: Sitting, BP Cuff Size: Adult)   Pulse 76   Resp 16   Ht 1.575 m (5' 2\")   Wt 80.3 kg (177 lb)   SpO2 96%     Physical Exam:  Appearance: Well-nourished, well-developed, in no acute distress  HEENT: Normocephalic, atraumatic, white sclera, PERRLA, oropharynx clear  Neck: No adenopathy or masses  Respiratory: no intercostal retractions or accessory muscle use  Lungs auscultation: Clear to auscultation bilaterally  Cardiovascular: Regular rate rhythm. No murmurs, rubs or gallops.  No LE edema  Abdomen: soft, nondistended  Gait: Normal, uses cane  Digits: No clubbing, cyanosis  Motor: No focal " deficits  Orientation: Oriented to time, person and place    Diagnosis:  1. Bronchiectasis without complication (HCC)  CT-CHEST (THORAX) W/O       Plan:  The patient's bronchiectasis is clinically stable.    Continue Pulmicort and Brovana nebulizers twice daily.  Update chest CAT scan without contrast for follow-up of bronchiectasis and bronchiolitis.  Annual influenza vaccine and pneumococcal vaccine q5 years.  Return in about 3 months (around 8/5/2020) for after CT scan.

## 2020-07-06 ENCOUNTER — HOSPITAL ENCOUNTER (OUTPATIENT)
Dept: LAB | Facility: MEDICAL CENTER | Age: 80
End: 2020-07-06
Attending: INTERNAL MEDICINE
Payer: MEDICARE

## 2020-07-06 LAB
ERYTHROCYTE [DISTWIDTH] IN BLOOD BY AUTOMATED COUNT: 50.2 FL (ref 35.9–50)
FERRITIN SERPL-MCNC: 21.6 NG/ML (ref 10–291)
HCT VFR BLD AUTO: 41.5 % (ref 37–47)
HGB BLD-MCNC: 13.1 G/DL (ref 12–16)
IRON SATN MFR SERPL: 15 % (ref 15–55)
IRON SERPL-MCNC: 56 UG/DL (ref 40–170)
MCH RBC QN AUTO: 27.5 PG (ref 27–33)
MCHC RBC AUTO-ENTMCNC: 31.6 G/DL (ref 33.6–35)
MCV RBC AUTO: 87.2 FL (ref 81.4–97.8)
PLATELET # BLD AUTO: 229 K/UL (ref 164–446)
PMV BLD AUTO: 10.2 FL (ref 9–12.9)
RBC # BLD AUTO: 4.76 M/UL (ref 4.2–5.4)
TIBC SERPL-MCNC: 375 UG/DL (ref 250–450)
UIBC SERPL-MCNC: 319 UG/DL (ref 110–370)
VIT B12 SERPL-MCNC: 179 PG/ML (ref 211–911)
WBC # BLD AUTO: 5.8 K/UL (ref 4.8–10.8)

## 2020-07-06 PROCEDURE — 36415 COLL VENOUS BLD VENIPUNCTURE: CPT

## 2020-07-06 PROCEDURE — 83540 ASSAY OF IRON: CPT

## 2020-07-06 PROCEDURE — 82728 ASSAY OF FERRITIN: CPT

## 2020-07-06 PROCEDURE — 82607 VITAMIN B-12: CPT

## 2020-07-06 PROCEDURE — 85027 COMPLETE CBC AUTOMATED: CPT

## 2020-07-06 PROCEDURE — 83550 IRON BINDING TEST: CPT

## 2020-08-06 ENCOUNTER — HOSPITAL ENCOUNTER (OUTPATIENT)
Dept: RADIOLOGY | Facility: MEDICAL CENTER | Age: 80
End: 2020-08-06
Attending: INTERNAL MEDICINE
Payer: MEDICARE

## 2020-08-06 DIAGNOSIS — J47.9 BRONCHIECTASIS WITHOUT COMPLICATION (HCC): ICD-10-CM

## 2020-08-06 PROCEDURE — 71250 CT THORAX DX C-: CPT | Mod: MG

## 2020-08-08 ENCOUNTER — PATIENT MESSAGE (OUTPATIENT)
Dept: CARDIOLOGY | Facility: MEDICAL CENTER | Age: 80
End: 2020-08-08

## 2020-08-08 DIAGNOSIS — I48.91 ATRIAL FIBRILLATION, UNSPECIFIED TYPE (HCC): ICD-10-CM

## 2020-08-08 DIAGNOSIS — I25.10 CORONARY ARTERY DISEASE INVOLVING NATIVE CORONARY ARTERY OF NATIVE HEART WITHOUT ANGINA PECTORIS: ICD-10-CM

## 2020-08-08 DIAGNOSIS — Z79.01 ANTICOAGULATED: ICD-10-CM

## 2020-08-17 NOTE — PATIENT COMMUNICATION
"Pt called to discuss. She is concerned about the finding that states \"enlarged heart\". She states she has been following up with PCP regarding her recent respiratory issues. She had been on nebulizers and was told her \"infection has cleared.\" She currently denies any significant symptoms. Advised pt to continue taking her medications as prescribed and monitor for any significant symptoms she may have prior to her appointment on 09/17/20. Pt verbalized understanding, appreciative of information.  "

## 2020-08-18 NOTE — PATIENT COMMUNICATION
Polo Covington M.D.  You 18 hours ago (2:45 PM)     Please set her up with an echocardiogram under my name to evaluate.        Echo order placed. Called pt to discuss and provided her with imaging scheduler phone number. She also inquired about lab work to be completed prior to her appointment next month. Routine labs ordered, she goes to [facility] lab. Verbalized understanding, appreciative of call and recommendations.

## 2020-08-19 DIAGNOSIS — J47.9 BRONCHIECTASIS WITHOUT COMPLICATION (HCC): ICD-10-CM

## 2020-08-19 RX ORDER — ALBUTEROL SULFATE 90 UG/1
2 AEROSOL, METERED RESPIRATORY (INHALATION) EVERY 4 HOURS PRN
Qty: 3 EACH | Refills: 3 | Status: SHIPPED | OUTPATIENT
Start: 2020-08-19

## 2020-08-19 NOTE — TELEPHONE ENCOUNTER
Have we ever prescribed this med? Yes.  If yes, what date?     Last OV: 05/05/2020 - Dr. Medley    Next OV: 08/20/2020 - Dr. Medley    DX: Bronchiectasis     Medications: Proair

## 2020-08-20 ENCOUNTER — TELEMEDICINE (OUTPATIENT)
Dept: PULMONOLOGY | Facility: HOSPICE | Age: 80
End: 2020-08-20
Payer: MEDICARE

## 2020-08-20 DIAGNOSIS — J47.9 BRONCHIECTASIS WITHOUT COMPLICATION (HCC): ICD-10-CM

## 2020-08-20 DIAGNOSIS — R91.8 PULMONARY NODULES: ICD-10-CM

## 2020-08-20 PROCEDURE — 99442 PR PHYSICIAN TELEPHONE EVALUATION 11-20 MIN: CPT | Performed by: INTERNAL MEDICINE

## 2020-08-20 NOTE — PROGRESS NOTES
Telephone Appointment Visit   As a means of avoiding spread of COVID-19, this visit is being conducted by telephone. This telephone visit was initiated by the patient and they verbally consented.    Time at start of call: 9:45    Reason for Call:  Chest CT results    HPI:     HPI: This patient is an 80 y.o. female who relocated from [place] where she was followed by pulmonology for bronchiectasis and bronchiolitis.  She had exacerbation of bronchiectasis November 2019 with chest CAT scan from [place] showing left lower lobe consolidation, centrilobular nodules and mucoid impaction as well as right centrilobular nodules compatible with respiratory bronchiolitis. She was switched to Pulmicort and Brovana nebulizers which have dramatically improved respiratory symptoms. Was previously on Trelegy, which was subjectively ineffective.    She states her cough is 100% better with minimal clear sputum.  She denies wheezing, dyspnea, chest tightness, fevers or weight loss. She has weaned off oral steroids.  Chest CAT scan August 6, 2020 showed no evidence of consolidation with scattered, bilateral pulmonary micronodules.  No lymphadenopathy noted.    Labs / Images Reviewed:       Assessment and Plan:     1. Pulmonary nodules  - CT-CHEST (THORAX) W/O; Future    2. Bronchiectasis without complication (HCC)    Clinically, the patient's respiratory symptoms have improved dramatically on nebulized bronchodilators.  Continue with Pulmicort and Brovana BID.  There is no evidence of lower respiratory tract infection by chest imaging.  She has scattered pulmonary micronodules, felt postinflammatory.  Recommend chest CAT scan without contrast in 6 months for follow-up.  Follow-up: 6 months    Time at end of call: 10;00  Total Time Spent: 15 min    Susan Medley M.D.

## 2020-08-21 ENCOUNTER — HOSPITAL ENCOUNTER (OUTPATIENT)
Dept: CARDIOLOGY | Facility: MEDICAL CENTER | Age: 80
End: 2020-08-21
Attending: INTERNAL MEDICINE
Payer: MEDICARE

## 2020-08-21 DIAGNOSIS — I25.10 CORONARY ARTERY DISEASE INVOLVING NATIVE CORONARY ARTERY OF NATIVE HEART WITHOUT ANGINA PECTORIS: ICD-10-CM

## 2020-08-21 DIAGNOSIS — I48.91 ATRIAL FIBRILLATION, UNSPECIFIED TYPE (HCC): ICD-10-CM

## 2020-08-21 LAB
LV EJECT FRACT  99904: 60
LV EJECT FRACT MOD 2C 99903: 66.19
LV EJECT FRACT MOD 4C 99902: 62.11
LV EJECT FRACT MOD BP 99901: 62.32

## 2020-08-21 PROCEDURE — 93306 TTE W/DOPPLER COMPLETE: CPT

## 2020-08-21 PROCEDURE — 93306 TTE W/DOPPLER COMPLETE: CPT | Mod: 26 | Performed by: INTERNAL MEDICINE

## 2020-09-14 ENCOUNTER — HOSPITAL ENCOUNTER (OUTPATIENT)
Dept: LAB | Facility: MEDICAL CENTER | Age: 80
End: 2020-09-14
Attending: INTERNAL MEDICINE
Payer: MEDICARE

## 2020-09-14 DIAGNOSIS — I48.91 ATRIAL FIBRILLATION, UNSPECIFIED TYPE (HCC): ICD-10-CM

## 2020-09-14 DIAGNOSIS — I25.10 CORONARY ARTERY DISEASE INVOLVING NATIVE CORONARY ARTERY OF NATIVE HEART WITHOUT ANGINA PECTORIS: ICD-10-CM

## 2020-09-14 DIAGNOSIS — Z79.01 ANTICOAGULATED: ICD-10-CM

## 2020-09-14 LAB
ALBUMIN SERPL BCP-MCNC: 4.1 G/DL (ref 3.2–4.9)
ALBUMIN/GLOB SERPL: 1.4 G/DL
ALP SERPL-CCNC: 72 U/L (ref 30–99)
ALT SERPL-CCNC: 10 U/L (ref 2–50)
ANION GAP SERPL CALC-SCNC: 10 MMOL/L (ref 7–16)
AST SERPL-CCNC: 11 U/L (ref 12–45)
BILIRUB SERPL-MCNC: 0.4 MG/DL (ref 0.1–1.5)
BUN SERPL-MCNC: 18 MG/DL (ref 8–22)
CALCIUM SERPL-MCNC: 9.5 MG/DL (ref 8.5–10.5)
CHLORIDE SERPL-SCNC: 99 MMOL/L (ref 96–112)
CHOLEST SERPL-MCNC: 167 MG/DL (ref 100–199)
CO2 SERPL-SCNC: 28 MMOL/L (ref 20–33)
CREAT SERPL-MCNC: 0.68 MG/DL (ref 0.5–1.4)
ERYTHROCYTE [DISTWIDTH] IN BLOOD BY AUTOMATED COUNT: 45.8 FL (ref 35.9–50)
FASTING STATUS PATIENT QL REPORTED: NORMAL
GLOBULIN SER CALC-MCNC: 3 G/DL (ref 1.9–3.5)
GLUCOSE SERPL-MCNC: 102 MG/DL (ref 65–99)
HCT VFR BLD AUTO: 42.1 % (ref 37–47)
HDLC SERPL-MCNC: 64 MG/DL
HGB BLD-MCNC: 13.6 G/DL (ref 12–16)
LDLC SERPL CALC-MCNC: 82 MG/DL
MCH RBC QN AUTO: 29.6 PG (ref 27–33)
MCHC RBC AUTO-ENTMCNC: 32.3 G/DL (ref 33.6–35)
MCV RBC AUTO: 91.7 FL (ref 81.4–97.8)
POTASSIUM SERPL-SCNC: 3.9 MMOL/L (ref 3.6–5.5)
PROT SERPL-MCNC: 7.1 G/DL (ref 6–8.2)
RBC # BLD AUTO: 4.59 M/UL (ref 4.2–5.4)
SODIUM SERPL-SCNC: 137 MMOL/L (ref 135–145)
TRIGL SERPL-MCNC: 103 MG/DL (ref 0–149)
WBC # BLD AUTO: 6.6 K/UL (ref 4.8–10.8)

## 2020-09-14 PROCEDURE — 85041 AUTOMATED RBC COUNT: CPT

## 2020-09-14 PROCEDURE — 85018 HEMOGLOBIN: CPT

## 2020-09-14 PROCEDURE — 80053 COMPREHEN METABOLIC PANEL: CPT

## 2020-09-14 PROCEDURE — 36415 COLL VENOUS BLD VENIPUNCTURE: CPT

## 2020-09-14 PROCEDURE — 85014 HEMATOCRIT: CPT

## 2020-09-14 PROCEDURE — 80061 LIPID PANEL: CPT

## 2020-09-14 PROCEDURE — 85048 AUTOMATED LEUKOCYTE COUNT: CPT

## 2020-09-17 ENCOUNTER — OFFICE VISIT (OUTPATIENT)
Dept: CARDIOLOGY | Facility: MEDICAL CENTER | Age: 80
End: 2020-09-17
Payer: MEDICARE

## 2020-09-17 VITALS
DIASTOLIC BLOOD PRESSURE: 68 MMHG | BODY MASS INDEX: 25.4 KG/M2 | HEIGHT: 62 IN | OXYGEN SATURATION: 96 % | HEART RATE: 60 BPM | WEIGHT: 138 LBS | SYSTOLIC BLOOD PRESSURE: 144 MMHG

## 2020-09-17 DIAGNOSIS — I77.1 ILIAC ARTERY STENOSIS, BILATERAL (HCC): ICD-10-CM

## 2020-09-17 DIAGNOSIS — E78.2 MIXED HYPERLIPIDEMIA: ICD-10-CM

## 2020-09-17 DIAGNOSIS — I25.10 CORONARY ARTERY DISEASE INVOLVING NATIVE CORONARY ARTERY OF NATIVE HEART WITHOUT ANGINA PECTORIS: ICD-10-CM

## 2020-09-17 DIAGNOSIS — I48.91 ATRIAL FIBRILLATION, UNSPECIFIED TYPE (HCC): ICD-10-CM

## 2020-09-17 DIAGNOSIS — I10 ESSENTIAL HYPERTENSION, BENIGN: ICD-10-CM

## 2020-09-17 DIAGNOSIS — Z79.899 HIGH RISK MEDICATION USE: ICD-10-CM

## 2020-09-17 DIAGNOSIS — Z79.01 ANTICOAGULATED: ICD-10-CM

## 2020-09-17 LAB — EKG IMPRESSION: NORMAL

## 2020-09-17 PROCEDURE — 99215 OFFICE O/P EST HI 40 MIN: CPT | Performed by: INTERNAL MEDICINE

## 2020-09-17 PROCEDURE — 93000 ELECTROCARDIOGRAM COMPLETE: CPT | Performed by: INTERNAL MEDICINE

## 2020-09-17 RX ORDER — NITROGLYCERIN 0.4 MG/1
0.4 TABLET SUBLINGUAL PRN
Qty: 25 TAB | Refills: 3 | Status: SHIPPED | OUTPATIENT
Start: 2020-09-17

## 2020-09-17 RX ORDER — LOSARTAN POTASSIUM 50 MG/1
50 TABLET ORAL DAILY
Qty: 90 TAB | Refills: 3 | Status: SHIPPED | OUTPATIENT
Start: 2020-09-17 | End: 2021-08-23

## 2020-09-17 RX ORDER — CYANOCOBALAMIN (VITAMIN B-12) 2000 MCG
2500 TABLET ORAL DAILY
COMMUNITY
Start: 2020-07-07

## 2020-09-17 RX ORDER — HYDROCHLOROTHIAZIDE 25 MG/1
25 TABLET ORAL DAILY
Qty: 90 TAB | Refills: 3 | Status: SHIPPED | OUTPATIENT
Start: 2020-09-17 | End: 2021-08-23

## 2020-09-17 RX ORDER — PRAVASTATIN SODIUM 40 MG
40 TABLET ORAL DAILY
Qty: 90 TAB | Refills: 3 | Status: SHIPPED | OUTPATIENT
Start: 2020-09-17 | End: 2021-08-04

## 2020-09-17 ASSESSMENT — FIBROSIS 4 INDEX: FIB4 SCORE: 1.25

## 2020-09-17 NOTE — PROGRESS NOTES
Chief Complaint   Patient presents with   • Atrial Fibrillation   • HTN (Controlled)     F/V Dx: Renovascular hypertension   • Coronary Artery Disease     F/V Dx: Coronary artery disease involving native coronary artery of native heart without angina pectoris       Subjective:   Suzie Mireles is a 80 y.o. female who presents today for follow-up of atrial fibrillation managed with sotalol and apixaban to good effect.  She currently is in sinus rhythm.  She also has underlying CAD and follows with a cardiologist in [place] for many years as well.  She negates she has had an echocardiogram although these results are unavailable.  She feels she was told it was normal.  She has no exertional symptoms and has not had no issues with paroxysmal of atrial fibrillation or stroke.  She has no bleeding issues.    In the interim she has had no issues with her anticoagulation.  She continues on her sotalol.  EKG today reveals sinus bradycardia with no adverse effects from her sotalol therapy.  Medications were refilled.  Blood pressure is appropriate.  Lipid profile is suboptimal.    Past Medical History:   Diagnosis Date   • Asthma    • Atrial fibrillation (HCC)    • Bronchiectasis (HCC)    • Cardiac arrhythmia    • Chickenpox    • Constipation    • COPD (chronic obstructive pulmonary disease) (HCC)    • Cough    • Diarrhea    • GERD (gastroesophageal reflux disease)    • [place] measles    • Heart attack (HCC)    • Hypertension    • Influenza    • Insomnia    • Peripheral vascular disease (HCC)    • Rheumatic fever    • Sleep apnea    • Sputum production    • Wears glasses    • Weight loss    • Wheezing      Past Surgical History:   Procedure Laterality Date   • CARPAL TUNNEL RELEASE     • HYSTERECTOMY LAPAROSCOPY     • LAMINOTOMY     • TONSILLECTOMY       Family History   Problem Relation Age of Onset   • Anemia Neg Hx    • Asthma Neg Hx      Social History     Socioeconomic History   • Marital status:      Spouse  name: Not on file   • Number of children: Not on file   • Years of education: Not on file   • Highest education level: Not on file   Occupational History   • Not on file   Social Needs   • Financial resource strain: Not on file   • Food insecurity     Worry: Not on file     Inability: Not on file   • Transportation needs     Medical: Not on file     Non-medical: Not on file   Tobacco Use   • Smoking status: Former Smoker     Packs/day: 1.00     Years: 36.00     Pack years: 36.00     Types: Cigarettes     Quit date:      Years since quittin.7   • Smokeless tobacco: Never Used   Substance and Sexual Activity   • Alcohol use: Never     Frequency: Never   • Drug use: Never   • Sexual activity: Not on file   Lifestyle   • Physical activity     Days per week: Not on file     Minutes per session: Not on file   • Stress: Not on file   Relationships   • Social connections     Talks on phone: Not on file     Gets together: Not on file     Attends Shinto service: Not on file     Active member of club or organization: Not on file     Attends meetings of clubs or organizations: Not on file     Relationship status: Not on file   • Intimate partner violence     Fear of current or ex partner: Not on file     Emotionally abused: Not on file     Physically abused: Not on file     Forced sexual activity: Not on file   Other Topics Concern   • Not on file   Social History Narrative   • Not on file     No Known Allergies  Outpatient Encounter Medications as of 2020   Medication Sig Dispense Refill   • Cyanocobalamin (B-12) 2000 MCG Tab Take 2,500 mcg by mouth 1 time daily as needed.     • hydroCHLOROthiazide (HYDRODIURIL) 25 MG Tab Take 1 Tab by mouth every day. 90 Tab 3   • losartan (COZAAR) 50 MG Tab Take 1 Tab by mouth every day. 90 Tab 3   • nitroglycerin (NITROSTAT) 0.4 MG SL Tab Place 1 Tab under tongue as needed for Chest Pain. 25 Tab 3   • pravastatin (PRAVACHOL) 40 MG tablet Take 1 Tab by mouth every day. 90  "Tab 3   • albuterol (PROAIR HFA) 108 (90 Base) MCG/ACT Aero Soln inhalation aerosol Inhale 2 Puffs by mouth every four hours as needed for Shortness of Breath. 3 Each 3   • XIIDRA 5 % Solution      • IRON PO Take 65 mg by mouth.     • sotalol (BETAPACE) 80 MG Tab Take 1 Tab by mouth 2 times a day. 180 Tab 3   • Verapamil HCl  MG CAPSULE SR 24 HR      • Multiple Vitamins-Minerals (ICAPS AREDS 2) Cap Take  by mouth every day.     • levothyroxine (SYNTHROID) 125 MCG Tab Take 125 mcg by mouth Every morning on an empty stomach.     • omeprazole (PRILOSEC) 20 MG Tablet Delayed Response delayed-release tablet Take 20 mg by mouth every day.     • apixaban (ELIQUIS) 5mg Tab Take 5 mg by mouth.     • Cholecalciferol (VITAMIN D3) 5000 units Tab Take 25 mcg by mouth every day.     • budesonide (PULMICORT) 0.25 MG/2ML Suspension Inhale 0.25 mg by mouth 2 times a day.     • Arformoterol Tartrate 15 MCG/2ML Nebu Soln Inhale 15 mcg by mouth 2 Times a Day.     • NON SPECIFIED Avastin shot for Macular     • albuterol 108 (90 Base) MCG/ACT Aero Soln inhalation aerosol Inhale  by mouth.     • [DISCONTINUED] hydroCHLOROthiazide (HYDRODIURIL) 25 MG Tab Take 25 mg by mouth every day.     • [DISCONTINUED] losartan (COZAAR) 50 MG Tab Take 50 mg by mouth every day.     • [DISCONTINUED] nitroglycerin (NITROSTAT) 0.4 MG SL Tab      • [DISCONTINUED] pravastatin (PRAVACHOL) 20 MG Tab Take 20 mg by mouth every day.       No facility-administered encounter medications on file as of 9/17/2020.      Review of Systems   All other systems reviewed and are negative.       Objective:   /68 (BP Location: Left arm, Patient Position: Sitting, BP Cuff Size: Adult)   Pulse 60   Ht 1.575 m (5' 2\")   Wt 62.6 kg (138 lb)   SpO2 96%   BMI 25.24 kg/m²     Physical Exam   Constitutional: She is oriented to person, place, and time. She appears well-developed and well-nourished. No distress.   Appears stated age.   HENT:   Head: Normocephalic and " atraumatic.   Right Ear: External ear normal.   Left Ear: External ear normal.   Mouth/Throat: No oropharyngeal exudate.   Eyes: Pupils are equal, round, and reactive to light. Conjunctivae and EOM are normal. Right eye exhibits no discharge. Left eye exhibits no discharge. No scleral icterus.   Neck: Normal range of motion. Neck supple. No JVD present. No tracheal deviation present. No thyromegaly present.   Cardiovascular: Normal rate, regular rhythm, S1 normal, S2 normal, normal heart sounds and intact distal pulses. PMI is not displaced. Exam reveals no gallop, no S3, no S4 and no friction rub.   No murmur heard.  Pulses:       Carotid pulses are 2+ on the right side and 2+ on the left side.       Radial pulses are 2+ on the right side and 2+ on the left side.        Popliteal pulses are 2+ on the right side and 2+ on the left side.        Dorsalis pedis pulses are 1+ on the right side and 1+ on the left side.        Posterior tibial pulses are 1+ on the right side and 1+ on the left side.   Pulmonary/Chest: Effort normal and breath sounds normal. No respiratory distress. She has no wheezes. She has no rales. She exhibits no tenderness.   Abdominal: Soft. Bowel sounds are normal. She exhibits no distension. There is no abdominal tenderness.   Musculoskeletal: Normal range of motion.         General: Edema ( 1+ bilateral lower extremities) present. No tenderness.   Neurological: She is alert and oriented to person, place, and time. No cranial nerve deficit (Cranial nerves II through XII grossly intact).   Skin: Skin is warm and dry. No rash noted. She is not diaphoretic. No erythema.   Psychiatric: She has a normal mood and affect. Her behavior is normal. Judgment and thought content normal.   Vitals reviewed.  No significant change in physical exam since prior 3/11/2020     LABS:  Lab Results   Component Value Date/Time    CHOLSTRLTOT 167 09/14/2020 06:24 AM    LDL 82 09/14/2020 06:24 AM    HDL 64 09/14/2020  06:24 AM    TRIGLYCERIDE 103 09/14/2020 06:24 AM       Lab Results   Component Value Date/Time    WBC 6.6 09/14/2020 06:24 AM    RBC 4.59 09/14/2020 06:24 AM    HEMOGLOBIN 13.6 09/14/2020 06:24 AM    HEMATOCRIT 42.1 09/14/2020 06:24 AM    MCV 91.7 09/14/2020 06:24 AM     Lab Results   Component Value Date/Time    SODIUM 137 09/14/2020 06:24 AM    POTASSIUM 3.9 09/14/2020 06:24 AM    CHLORIDE 99 09/14/2020 06:24 AM    CO2 28 09/14/2020 06:24 AM    GLUCOSE 102 (H) 09/14/2020 06:24 AM    BUN 18 09/14/2020 06:24 AM    CREATININE 0.68 09/14/2020 06:24 AM     Lab Results   Component Value Date    HBA1C 5.6 03/13/2020      Lab Results   Component Value Date/Time    ALKPHOSPHAT 72 09/14/2020 06:24 AM    ASTSGOT 11 (L) 09/14/2020 06:24 AM    ALTSGPT 10 09/14/2020 06:24 AM    TBILIRUBIN 0.4 09/14/2020 06:24 AM      No results found for: BNPBTYPENAT   No results found for: TSH  No results found for: PROTHROMBTM, INR     EKG (9/17/2020):  I have personally reviewed the EKG this visit and discussed with the patient.  Sinus bradycardia.   ms.  QTc 4 and 73 ms.    ECHO CONCLUSIONS (8/21/2020):  No prior study is available for comparison.   Normal left ventricular size and systolic function.  Left ventricular ejection fraction is visually estimated to be 60%.  Mild concentric left ventricular hypertrophy.  Mild mitral regurgitation.  Estimated right ventricular systolic pressure  is 35 mmHg.    EKG (11/12/2019):  I have personally reviewed the EKG this visit and discussed with the patient.  Sinus rhythm nonspecific ST-T wave changes.        Assessment:     1. Coronary artery disease involving native coronary artery of native heart without angina pectoris  nitroglycerin (NITROSTAT) 0.4 MG SL Tab    pravastatin (PRAVACHOL) 40 MG tablet   2. Atrial fibrillation, unspecified type (HCC)  EKG    EKG   3. Anticoagulated     4. High risk medication use  EKG    EKG   5. Mixed hyperlipidemia  pravastatin (PRAVACHOL) 40 MG tablet   6.  Essential hypertension, benign  hydroCHLOROthiazide (HYDRODIURIL) 25 MG Tab    losartan (COZAAR) 50 MG Tab   7. Iliac artery stenosis, bilateral (HCC)  US-EXTREMITY ARTERY LOWER BILAT W/DAYLIN (COMBO)       Medical Decision Making:  Today's Assessment / Status / Plan:     Overall she is doing very well.  EKG shows no changes and good intervals with regard to her sotalol.  Tolerating her anticoagulation well.  Renal function is stable and no dose adjustments are necessary.  Lipid profile is suboptimal and recommend adjusting her Pravachol for goal LDL less than 70.  It has been several years since she was due to follow-up for her bilateral iliac stenting and therefore I will evaluate her with lower extremity arterial ultrasound and refer if there is need.  Continue aspirin.    1.  Increase pravastatin to 40 mg daily from daily.  2.  Refill nitroglycerin, she has not had need for several years  3.  Refill her other antihypertensive therapies  4.  Lower extremity ultrasound bilateral for history of iliac stenting.  She also notes that her right foot becomes cold sometimes particularly in the evenings.  She is unsure if this is blood flow or neuropathy.  5.  Continue current dose of sotalol and direct oral anticoagulation.  Follow-up routinely in 6 months with metabolic panel and ECG.

## 2020-09-22 ENCOUNTER — APPOINTMENT (RX ONLY)
Dept: URBAN - METROPOLITAN AREA CLINIC 22 | Facility: CLINIC | Age: 80
Setting detail: DERMATOLOGY
End: 2020-09-22

## 2020-09-22 DIAGNOSIS — D22 MELANOCYTIC NEVI: ICD-10-CM

## 2020-09-22 DIAGNOSIS — L57.0 ACTINIC KERATOSIS: ICD-10-CM

## 2020-09-22 DIAGNOSIS — Z71.89 OTHER SPECIFIED COUNSELING: ICD-10-CM

## 2020-09-22 DIAGNOSIS — L44.8 OTHER SPECIFIED PAPULOSQUAMOUS DISORDERS: ICD-10-CM

## 2020-09-22 DIAGNOSIS — D18.0 HEMANGIOMA: ICD-10-CM

## 2020-09-22 DIAGNOSIS — L81.4 OTHER MELANIN HYPERPIGMENTATION: ICD-10-CM

## 2020-09-22 DIAGNOSIS — L82.1 OTHER SEBORRHEIC KERATOSIS: ICD-10-CM

## 2020-09-22 DIAGNOSIS — L85.3 XEROSIS CUTIS: ICD-10-CM

## 2020-09-22 DIAGNOSIS — L82.0 INFLAMED SEBORRHEIC KERATOSIS: ICD-10-CM

## 2020-09-22 PROBLEM — D22.5 MELANOCYTIC NEVI OF TRUNK: Status: ACTIVE | Noted: 2020-09-22

## 2020-09-22 PROBLEM — D18.01 HEMANGIOMA OF SKIN AND SUBCUTANEOUS TISSUE: Status: ACTIVE | Noted: 2020-09-22

## 2020-09-22 PROCEDURE — 17110 DESTRUCTION B9 LES UP TO 14: CPT

## 2020-09-22 PROCEDURE — 17003 DESTRUCT PREMALG LES 2-14: CPT | Mod: 59

## 2020-09-22 PROCEDURE — 99203 OFFICE O/P NEW LOW 30 MIN: CPT | Mod: 25

## 2020-09-22 PROCEDURE — ? LIQUID NITROGEN

## 2020-09-22 PROCEDURE — 17000 DESTRUCT PREMALG LESION: CPT | Mod: 59

## 2020-09-22 PROCEDURE — ? COUNSELING

## 2020-09-22 ASSESSMENT — LOCATION SIMPLE DESCRIPTION DERM
LOCATION SIMPLE: RIGHT UPPER BACK
LOCATION SIMPLE: INFERIOR FOREHEAD
LOCATION SIMPLE: LEFT HAND
LOCATION SIMPLE: LEFT FOREARM
LOCATION SIMPLE: RIGHT FOREARM
LOCATION SIMPLE: LEFT POPLITEAL SKIN
LOCATION SIMPLE: UPPER BACK
LOCATION SIMPLE: LEFT PRETIBIAL REGION
LOCATION SIMPLE: CHEST
LOCATION SIMPLE: RIGHT PRETIBIAL REGION
LOCATION SIMPLE: RIGHT THIGH
LOCATION SIMPLE: LEFT CHEEK

## 2020-09-22 ASSESSMENT — LOCATION DETAILED DESCRIPTION DERM
LOCATION DETAILED: INFERIOR MID FOREHEAD
LOCATION DETAILED: LEFT DISTAL PRETIBIAL REGION
LOCATION DETAILED: RIGHT PROXIMAL PRETIBIAL REGION
LOCATION DETAILED: LEFT SUPERIOR CENTRAL MALAR CHEEK
LOCATION DETAILED: LEFT PROXIMAL PRETIBIAL REGION
LOCATION DETAILED: LEFT POPLITEAL SKIN
LOCATION DETAILED: LOWER STERNUM
LOCATION DETAILED: RIGHT ANTERIOR PROXIMAL THIGH
LOCATION DETAILED: LEFT VENTRAL PROXIMAL FOREARM
LOCATION DETAILED: SUPERIOR THORACIC SPINE
LOCATION DETAILED: RIGHT SUPERIOR UPPER BACK
LOCATION DETAILED: INFERIOR THORACIC SPINE
LOCATION DETAILED: LEFT ULNAR DORSAL HAND
LOCATION DETAILED: RIGHT VENTRAL PROXIMAL FOREARM

## 2020-09-22 ASSESSMENT — LOCATION ZONE DERM
LOCATION ZONE: HAND
LOCATION ZONE: LEG
LOCATION ZONE: FACE
LOCATION ZONE: ARM
LOCATION ZONE: TRUNK

## 2020-09-22 NOTE — PROCEDURE: LIQUID NITROGEN
Medical Necessity Clause: This procedure was medically necessary because the lesions that were treated were:
Include Z78.9 (Other Specified Conditions Influencing Health Status) As An Associated Diagnosis?: No
Post-Care Instructions: I reviewed with the patient in detail post-care instructions. Patient is to wear sunprotection, and avoid picking at any of the treated lesions. Pt may apply Vaseline to crusted or scabbing areas.
Medical Necessity Information: It is in your best interest to select a reason for this procedure from the list below. All of these items fulfill various CMS LCD requirements except the new and changing color options.
Detail Level: Detailed
Consent: The patient's consent was obtained including but not limited to risks of crusting, scabbing, blistering, scarring, darker or lighter pigmentary change, recurrence, incomplete removal and infection.
Duration Of Freeze Thaw-Cycle (Seconds): 0

## 2020-09-25 ENCOUNTER — HOSPITAL ENCOUNTER (OUTPATIENT)
Dept: RADIOLOGY | Facility: MEDICAL CENTER | Age: 80
End: 2020-09-25
Attending: INTERNAL MEDICINE
Payer: MEDICARE

## 2020-09-25 DIAGNOSIS — I77.1 ILIAC ARTERY STENOSIS, BILATERAL (HCC): ICD-10-CM

## 2020-09-25 PROCEDURE — 93978 VASCULAR STUDY: CPT

## 2020-09-25 PROCEDURE — 93978 VASCULAR STUDY: CPT | Mod: 26 | Performed by: INTERNAL MEDICINE

## 2020-09-25 PROCEDURE — 93922 UPR/L XTREMITY ART 2 LEVELS: CPT

## 2020-09-25 PROCEDURE — 93922 UPR/L XTREMITY ART 2 LEVELS: CPT | Mod: 26 | Performed by: INTERNAL MEDICINE

## 2020-09-30 ENCOUNTER — HOSPITAL ENCOUNTER (OUTPATIENT)
Dept: LAB | Facility: MEDICAL CENTER | Age: 80
End: 2020-09-30
Attending: INTERNAL MEDICINE
Payer: MEDICARE

## 2020-09-30 LAB
ALBUMIN SERPL BCP-MCNC: 4.2 G/DL (ref 3.2–4.9)
ALBUMIN/GLOB SERPL: 1.3 G/DL
ALP SERPL-CCNC: 77 U/L (ref 30–99)
ALT SERPL-CCNC: 9 U/L (ref 2–50)
ANION GAP SERPL CALC-SCNC: 13 MMOL/L (ref 7–16)
AST SERPL-CCNC: 13 U/L (ref 12–45)
BASOPHILS # BLD AUTO: 0.6 % (ref 0–1.8)
BASOPHILS # BLD: 0.04 K/UL (ref 0–0.12)
BILIRUB SERPL-MCNC: 0.5 MG/DL (ref 0.1–1.5)
BUN SERPL-MCNC: 16 MG/DL (ref 8–22)
CALCIUM SERPL-MCNC: 9.8 MG/DL (ref 8.5–10.5)
CHLORIDE SERPL-SCNC: 99 MMOL/L (ref 96–112)
CO2 SERPL-SCNC: 26 MMOL/L (ref 20–33)
CREAT SERPL-MCNC: 0.64 MG/DL (ref 0.5–1.4)
EOSINOPHIL # BLD AUTO: 0.16 K/UL (ref 0–0.51)
EOSINOPHIL NFR BLD: 2.4 % (ref 0–6.9)
ERYTHROCYTE [DISTWIDTH] IN BLOOD BY AUTOMATED COUNT: 45.7 FL (ref 35.9–50)
GLOBULIN SER CALC-MCNC: 3.2 G/DL (ref 1.9–3.5)
GLUCOSE SERPL-MCNC: 94 MG/DL (ref 65–99)
HCT VFR BLD AUTO: 43.4 % (ref 37–47)
HGB BLD-MCNC: 14 G/DL (ref 12–16)
IMM GRANULOCYTES # BLD AUTO: 0.01 K/UL (ref 0–0.11)
IMM GRANULOCYTES NFR BLD AUTO: 0.2 % (ref 0–0.9)
IRON SATN MFR SERPL: 24 % (ref 15–55)
IRON SERPL-MCNC: 97 UG/DL (ref 40–170)
LYMPHOCYTES # BLD AUTO: 2.14 K/UL (ref 1–4.8)
LYMPHOCYTES NFR BLD: 32.6 % (ref 22–41)
MCH RBC QN AUTO: 29.5 PG (ref 27–33)
MCHC RBC AUTO-ENTMCNC: 32.3 G/DL (ref 33.6–35)
MCV RBC AUTO: 91.6 FL (ref 81.4–97.8)
MONOCYTES # BLD AUTO: 0.65 K/UL (ref 0–0.85)
MONOCYTES NFR BLD AUTO: 9.9 % (ref 0–13.4)
NEUTROPHILS # BLD AUTO: 3.56 K/UL (ref 2–7.15)
NEUTROPHILS NFR BLD: 54.3 % (ref 44–72)
NRBC # BLD AUTO: 0 K/UL
NRBC BLD-RTO: 0 /100 WBC
PLATELET # BLD AUTO: 238 K/UL (ref 164–446)
PMV BLD AUTO: 10.5 FL (ref 9–12.9)
POTASSIUM SERPL-SCNC: 4.3 MMOL/L (ref 3.6–5.5)
PROT SERPL-MCNC: 7.4 G/DL (ref 6–8.2)
RBC # BLD AUTO: 4.74 M/UL (ref 4.2–5.4)
SODIUM SERPL-SCNC: 138 MMOL/L (ref 135–145)
TIBC SERPL-MCNC: 398 UG/DL (ref 250–450)
UIBC SERPL-MCNC: 301 UG/DL (ref 110–370)
WBC # BLD AUTO: 6.6 K/UL (ref 4.8–10.8)

## 2020-09-30 PROCEDURE — 36415 COLL VENOUS BLD VENIPUNCTURE: CPT

## 2020-09-30 PROCEDURE — 82728 ASSAY OF FERRITIN: CPT

## 2020-09-30 PROCEDURE — 80053 COMPREHEN METABOLIC PANEL: CPT

## 2020-09-30 PROCEDURE — 82607 VITAMIN B-12: CPT

## 2020-09-30 PROCEDURE — 85025 COMPLETE CBC W/AUTO DIFF WBC: CPT

## 2020-09-30 PROCEDURE — 83550 IRON BINDING TEST: CPT

## 2020-09-30 PROCEDURE — 83540 ASSAY OF IRON: CPT

## 2020-10-01 LAB
FERRITIN SERPL-MCNC: 40.5 NG/ML (ref 10–291)
VIT B12 SERPL-MCNC: 1648 PG/ML (ref 211–911)

## 2020-10-08 ENCOUNTER — TELEPHONE (OUTPATIENT)
Dept: CARDIOLOGY | Facility: MEDICAL CENTER | Age: 80
End: 2020-10-08

## 2020-10-08 NOTE — TELEPHONE ENCOUNTER
TW    Hello, patient calling for a medication refill for Verapamil HCl  MG CAPSULE SR 24 HR, she will like 30 month supply She will like a call back at [phone]        Thank you!

## 2020-10-12 NOTE — TELEPHONE ENCOUNTER
Received call from Michelle (operators), pt calling back for confirmation of med refill as she is running out of her supply soon. Advised pt should reach out to PCP in the mean time for refill as recommendations not yet received.

## 2020-10-12 NOTE — TELEPHONE ENCOUNTER
Patient called in looking for an update. Pt has the information on their Pill bottle. It reads as follows: 300MG slow release  to take 1 cap daily at bedtime.       Thank you,  Michelle MARTINEZ

## 2020-10-13 NOTE — TELEPHONE ENCOUNTER
Dr. Nadya Corcoran is rounding in the hospital but still available to answer my chart questions and is responsible for his outpatient care.    Thank you.

## 2020-10-14 RX ORDER — VERAPAMIL HYDROCHLORIDE 300 MG/1
300 CAPSULE, EXTENDED RELEASE ORAL DAILY
Qty: 30 CAP | Refills: 0 | Status: SHIPPED | OUTPATIENT
Start: 2020-10-14 | End: 2020-10-15 | Stop reason: CLARIF

## 2020-10-14 NOTE — PROGRESS NOTES
Per TW last OV notes 09/17/20:    3.  Refill her other antihypertensive therapies      Refill sent to [facility].

## 2020-10-15 RX ORDER — VERAPAMIL HYDROCHLORIDE 300 MG/1
300 CAPSULE, EXTENDED RELEASE ORAL DAILY
Qty: 90 CAP | Refills: 3 | Status: SHIPPED | OUTPATIENT
Start: 2020-10-15 | End: 2020-12-29 | Stop reason: SDUPTHER

## 2020-10-15 NOTE — TELEPHONE ENCOUNTER
Pt called back to advise for future refills she would like a 90 day supply plus 3 refills to last her for the year because its the same price as a 30 day supply.    Please call Pt if you have questions at [phone]    Thank you

## 2020-10-15 NOTE — PROGRESS NOTES
Michelle [facility] routed conversation to You 3 minutes ago (9:48 AM)      Michelle [facility] 3 minutes ago (9:48 AM)        Pt called back to advise for future refills she would like a 90 day supply plus 3 refills to last her for the year because its the same price as a 30 day supply.     Please call Pt if you have questions at [phone]     Thank you           Prescription reordered.

## 2020-11-11 ENCOUNTER — HOSPITAL ENCOUNTER (OUTPATIENT)
Dept: RADIOLOGY | Facility: MEDICAL CENTER | Age: 80
End: 2020-11-11
Attending: INTERNAL MEDICINE
Payer: MEDICARE

## 2020-11-11 DIAGNOSIS — R91.8 PULMONARY NODULES: ICD-10-CM

## 2020-11-11 PROCEDURE — 71250 CT THORAX DX C-: CPT

## 2020-12-29 DIAGNOSIS — I10 ESSENTIAL HYPERTENSION, BENIGN: ICD-10-CM

## 2020-12-29 DIAGNOSIS — I48.91 ATRIAL FIBRILLATION, UNSPECIFIED TYPE (HCC): ICD-10-CM

## 2020-12-29 RX ORDER — VERAPAMIL HYDROCHLORIDE 300 MG/1
300 CAPSULE, EXTENDED RELEASE ORAL DAILY
Qty: 90 CAP | Refills: 4 | Status: SHIPPED | OUTPATIENT
Start: 2020-12-29

## 2020-12-29 NOTE — PROGRESS NOTES
FW: refill  Received: Yesterday  Message Contents   [facility]  Mayelin Day R.N.          Previous Messages    ----- Message -----   From: Natalie Rose, Med Ass't   Sent: 12/24/2020   9:25 AM PST   To: Verna Winn/Ted   Subject: refill                                           TW     Pt calling for refills on Rx Verapamil HCl  MG CAPSULE SR 24 HR and apixaban (ELIQUIS) 5mg Tab. Pt states that she is not out of the pills but he is out of refills. Pt is requesting a 3 month supply with 4 refills due to it being cheaper. Pt would like it sent through [facility].   Ph: [phone]     Thank you.        Prescriptions renewed and sent to [facility].

## 2021-01-16 DIAGNOSIS — Z23 NEED FOR VACCINATION: ICD-10-CM

## 2021-01-29 DIAGNOSIS — J43.1 PANLOBULAR EMPHYSEMA (HCC): ICD-10-CM

## 2021-01-29 RX ORDER — ARFORMOTEROL TARTRATE 15 UG/2ML
15 SOLUTION RESPIRATORY (INHALATION) 2 TIMES DAILY
Qty: 180 ML | Refills: 3 | Status: SHIPPED | OUTPATIENT
Start: 2021-01-29 | End: 2021-06-23 | Stop reason: SDUPTHER

## 2021-01-29 RX ORDER — BUDESONIDE 0.25 MG/2ML
250 INHALANT ORAL 2 TIMES DAILY
Qty: 180 ML | Refills: 3 | Status: SHIPPED | OUTPATIENT
Start: 2021-01-29 | End: 2021-06-23 | Stop reason: SDUPTHER

## 2021-01-29 NOTE — TELEPHONE ENCOUNTER
Have we ever prescribed this med? Yes.  If yes, what date? 11/7/2019    Last OV: 8/20/2020 Jasmyne    Next OV: 2/25/2021 Jasmyne    DX: Emphysema    Medications: Budesonide and Bovana

## 2021-02-24 ENCOUNTER — PATIENT MESSAGE (OUTPATIENT)
Dept: CARDIOLOGY | Facility: MEDICAL CENTER | Age: 81
End: 2021-02-24

## 2021-02-24 DIAGNOSIS — I48.11 LONGSTANDING PERSISTENT ATRIAL FIBRILLATION (HCC): ICD-10-CM

## 2021-02-24 DIAGNOSIS — Z79.899 HIGH RISK MEDICATION USE: ICD-10-CM

## 2021-02-24 RX ORDER — SOTALOL HYDROCHLORIDE 80 MG/1
80 TABLET ORAL 2 TIMES DAILY
Qty: 180 TABLET | Refills: 0 | Status: SHIPPED | OUTPATIENT
Start: 2021-02-24 | End: 2021-05-14 | Stop reason: SDUPTHER

## 2021-02-25 ENCOUNTER — TELEMEDICINE (OUTPATIENT)
Dept: SLEEP MEDICINE | Facility: MEDICAL CENTER | Age: 81
End: 2021-02-25
Payer: MEDICARE

## 2021-02-25 VITALS — HEART RATE: 70 BPM | WEIGHT: 198 LBS | HEIGHT: 62 IN | OXYGEN SATURATION: 95 % | BODY MASS INDEX: 36.44 KG/M2

## 2021-02-25 DIAGNOSIS — J47.9 BRONCHIECTASIS WITHOUT COMPLICATION (HCC): ICD-10-CM

## 2021-02-25 DIAGNOSIS — R91.8 PULMONARY NODULES: ICD-10-CM

## 2021-02-25 PROCEDURE — 99213 OFFICE O/P EST LOW 20 MIN: CPT | Mod: 95,CR | Performed by: INTERNAL MEDICINE

## 2021-02-25 ASSESSMENT — FIBROSIS 4 INDEX: FIB4 SCORE: 1.49

## 2021-02-25 NOTE — PROGRESS NOTES
Telemedicine: Established Patient   This evaluation was conducted via [employer] using secure and encrypted videoconferencing technology.  The patient's identity was verified.    Subjective:   CC:   Chief Complaint   Patient presents with   • Follow-Up     Last seen 8/20/20 by Dr Medley for pulmonary nodules, Bronchiectasis without complication    • Results     CT done 11/11/20       Suzie Mireles is a 80 y.o. female presenting for evaluation and management of  bronchiectasis and bronchiolitis.  Originally diagnosed in [facility].  She last had exacerbation of bronchiectasis November 2019 with chest CAT scan from [place] showing left lower lobe consolidation, centrilobular nodules and mucoid impaction as well as right centrilobular nodules compatible with respiratory bronchiolitis. She was switched to Pulmicort and Brovana nebulizers since then which have dramatically improved respiratory symptoms. Was previously on Trelegy, which was subjectively ineffective.    She states her cough is 100% better with minimal, clear sputum.  She denies SOB, wheezing, chest tightness, fevers or weight loss. She weaned off oral steroids.  Chest CAT scan August 6, 2020 and November 11, 2020 reviewed and showed no evidence of consolidation with stable scattered, bilateral pulmonary micronodules/bronchiectasis.    Up-to-date on influenza vaccine and Covid vaccine.        ROS:As in HPI      No Known Allergies    Current medicines (including changes today)  Current Outpatient Medications   Medication Sig Dispense Refill   • sotalol (BETAPACE) 80 MG Tab Take 1 tablet by mouth 2 times a day. Please schedule a follow up appointment for further refills. Thank you! 180 tablet 0   • budesonide (PULMICORT) 0.25 MG/2ML Suspension Take 2 mL by nebulization 2 times a day for 90 days. 180 mL 3   • Arformoterol Tartrate (BROVANA) 15 MCG/2ML Nebu Soln Take 2 mL by nebulization 2 Times a Day for 90 days. 1 vial via nebulizer twice  a day. 180 mL 3   • Verapamil HCl  MG CAPSULE SR 24 HR Take 1 Cap by mouth every day. 90 Cap 4   • apixaban (ELIQUIS) 5mg Tab Take 1 Tab by mouth 2 Times a Day. 180 Tab 4   • Cyanocobalamin (B-12) 2000 MCG Tab Take 2,500 mcg by mouth 1 time daily as needed.     • hydroCHLOROthiazide (HYDRODIURIL) 25 MG Tab Take 1 Tab by mouth every day. 90 Tab 3   • losartan (COZAAR) 50 MG Tab Take 1 Tab by mouth every day. 90 Tab 3   • nitroglycerin (NITROSTAT) 0.4 MG SL Tab Place 1 Tab under tongue as needed for Chest Pain. 25 Tab 3   • pravastatin (PRAVACHOL) 40 MG tablet Take 1 Tab by mouth every day. 90 Tab 3   • albuterol (PROAIR HFA) 108 (90 Base) MCG/ACT Aero Soln inhalation aerosol Inhale 2 Puffs by mouth every four hours as needed for Shortness of Breath. 3 Each 3   • XIIDRA 5 % Solution      • Multiple Vitamins-Minerals (ICAPS AREDS 2) Cap Take  by mouth every day.     • NON SPECIFIED Avastin shot for Macular     • levothyroxine (SYNTHROID) 125 MCG Tab Take 125 mcg by mouth Every morning on an empty stomach.     • omeprazole (PRILOSEC) 20 MG Tablet Delayed Response delayed-release tablet Take 20 mg by mouth every day.     • Cholecalciferol (VITAMIN D3) 5000 units Tab Take 25 mcg by mouth every day.     • Arformoterol Tartrate 15 MCG/2ML Nebu Soln Inhale 15 mcg by mouth 2 Times a Day.     • albuterol 108 (90 Base) MCG/ACT Aero Soln inhalation aerosol Inhale  by mouth.     • IRON PO Take 65 mg by mouth.       No current facility-administered medications for this visit.       Patient Active Problem List    Diagnosis Date Noted   • AF (atrial fibrillation) (HCC) 11/12/2019   • High risk medication use 11/12/2019   • Renovascular hypertension 11/12/2019   • Coronary artery disease involving native coronary artery of native heart without angina pectoris 11/12/2019   • Iliac artery stenosis, bilateral ([facility]) 11/12/2019   • Panlobular emphysema ([facility]) 11/12/2019   • Anticoagulated 11/12/2019       Family History   Problem  "Relation Age of Onset   • Anemia Neg Hx    • Asthma Neg Hx        She  has a past medical history of Asthma, Atrial fibrillation (HCC), Bronchiectasis (HCC), Cardiac arrhythmia, Chickenpox, Constipation, COPD (chronic obstructive pulmonary disease) (HCC), Cough, Diarrhea, GERD (gastroesophageal reflux disease), Mongolian measles, Heart attack (HCC), Hypertension, Influenza, Insomnia, Peripheral vascular disease (HCC), Rheumatic fever, Sleep apnea, Sputum production, Wears glasses, Weight loss, and Wheezing.  She  has a past surgical history that includes laminotomy; hysterectomy laparoscopy; carpal tunnel release; and tonsillectomy.       Objective:   Pulse 70   Ht 1.575 m (5' 2\") Comment: pt stated  Wt 89.8 kg (198 lb) Comment: pt stated  SpO2 95%   BMI 36.21 kg/m²     Physical Exam  AOx3 in NAD.    Assessment and Plan:   The following treatment plan was discussed:     1. Bronchiectasis without complication (HCC)    2. Pulmonary nodules    The patient's bronchiectasis has been very stable . Her respiratory symptoms have essentially resolved on nebulized bronchodilators. Continue with Pulmicort and Brovana BID.  There is no evidence of lower respiratory tract infection by chest imaging.  She has scattered pulmonary micronodules, stable and felt benign/postinflammatory.   Follow-up: Return in about 6 months (around 8/25/2021).         "

## 2021-03-17 ENCOUNTER — HOSPITAL ENCOUNTER (OUTPATIENT)
Dept: LAB | Facility: MEDICAL CENTER | Age: 81
End: 2021-03-17
Attending: INTERNAL MEDICINE
Payer: MEDICARE

## 2021-03-17 LAB
BASOPHILS # BLD AUTO: 0.4 % (ref 0–1.8)
BASOPHILS # BLD: 0.03 K/UL (ref 0–0.12)
EOSINOPHIL # BLD AUTO: 0.23 K/UL (ref 0–0.51)
EOSINOPHIL NFR BLD: 3.4 % (ref 0–6.9)
ERYTHROCYTE [DISTWIDTH] IN BLOOD BY AUTOMATED COUNT: 45.3 FL (ref 35.9–50)
FERRITIN SERPL-MCNC: 23.8 NG/ML (ref 10–291)
HCT VFR BLD AUTO: 41.7 % (ref 37–47)
HGB BLD-MCNC: 13 G/DL (ref 12–16)
IMM GRANULOCYTES # BLD AUTO: 0.02 K/UL (ref 0–0.11)
IMM GRANULOCYTES NFR BLD AUTO: 0.3 % (ref 0–0.9)
IRON SATN MFR SERPL: 17 % (ref 15–55)
IRON SERPL-MCNC: 71 UG/DL (ref 40–170)
LYMPHOCYTES # BLD AUTO: 2.03 K/UL (ref 1–4.8)
LYMPHOCYTES NFR BLD: 30.2 % (ref 22–41)
MCH RBC QN AUTO: 28.6 PG (ref 27–33)
MCHC RBC AUTO-ENTMCNC: 31.2 G/DL (ref 33.6–35)
MCV RBC AUTO: 91.6 FL (ref 81.4–97.8)
MONOCYTES # BLD AUTO: 0.64 K/UL (ref 0–0.85)
MONOCYTES NFR BLD AUTO: 9.5 % (ref 0–13.4)
NEUTROPHILS # BLD AUTO: 3.78 K/UL (ref 2–7.15)
NEUTROPHILS NFR BLD: 56.2 % (ref 44–72)
NRBC # BLD AUTO: 0 K/UL
NRBC BLD-RTO: 0 /100 WBC
PLATELET # BLD AUTO: 251 K/UL (ref 164–446)
PMV BLD AUTO: 10.9 FL (ref 9–12.9)
RBC # BLD AUTO: 4.55 M/UL (ref 4.2–5.4)
TIBC SERPL-MCNC: 423 UG/DL (ref 250–450)
UIBC SERPL-MCNC: 352 UG/DL (ref 110–370)
VIT B12 SERPL-MCNC: 660 PG/ML (ref 211–911)
WBC # BLD AUTO: 6.7 K/UL (ref 4.8–10.8)

## 2021-03-17 PROCEDURE — 83540 ASSAY OF IRON: CPT

## 2021-03-17 PROCEDURE — 82728 ASSAY OF FERRITIN: CPT

## 2021-03-17 PROCEDURE — 85025 COMPLETE CBC W/AUTO DIFF WBC: CPT

## 2021-03-17 PROCEDURE — 83550 IRON BINDING TEST: CPT

## 2021-03-17 PROCEDURE — 36415 COLL VENOUS BLD VENIPUNCTURE: CPT

## 2021-03-17 PROCEDURE — 82607 VITAMIN B-12: CPT

## 2021-03-26 ENCOUNTER — TELEMEDICINE (OUTPATIENT)
Dept: CARDIOLOGY | Facility: MEDICAL CENTER | Age: 81
End: 2021-03-26
Payer: MEDICARE

## 2021-03-26 VITALS
HEIGHT: 62 IN | BODY MASS INDEX: 36.07 KG/M2 | HEART RATE: 56 BPM | WEIGHT: 196 LBS | DIASTOLIC BLOOD PRESSURE: 78 MMHG | SYSTOLIC BLOOD PRESSURE: 133 MMHG | OXYGEN SATURATION: 95 %

## 2021-03-26 DIAGNOSIS — I48.91 ATRIAL FIBRILLATION, UNSPECIFIED TYPE (HCC): ICD-10-CM

## 2021-03-26 DIAGNOSIS — Z79.899 HIGH RISK MEDICATION USE: ICD-10-CM

## 2021-03-26 DIAGNOSIS — Z79.01 ANTICOAGULATED: ICD-10-CM

## 2021-03-26 DIAGNOSIS — I48.11 LONGSTANDING PERSISTENT ATRIAL FIBRILLATION (HCC): ICD-10-CM

## 2021-03-26 DIAGNOSIS — I25.10 CORONARY ARTERY DISEASE INVOLVING NATIVE CORONARY ARTERY OF NATIVE HEART WITHOUT ANGINA PECTORIS: ICD-10-CM

## 2021-03-26 DIAGNOSIS — I10 ESSENTIAL HYPERTENSION, BENIGN: ICD-10-CM

## 2021-03-26 DIAGNOSIS — I77.1 ILIAC ARTERY STENOSIS, BILATERAL (HCC): ICD-10-CM

## 2021-03-26 PROCEDURE — 99214 OFFICE O/P EST MOD 30 MIN: CPT | Mod: 95 | Performed by: INTERNAL MEDICINE

## 2021-03-26 ASSESSMENT — FIBROSIS 4 INDEX: FIB4 SCORE: 1.42

## 2021-03-26 NOTE — LETTER
[facility]   [address] [place] [address] [address]  [place] [address]  Phone: [phone]  Fax: [phone]              Suzie Mireles  1940    Encounter Date: 3/26/2021    Polo Covington M.D.          PROGRESS NOTE:  Chief Complaint   Patient presents with   • Coronary Artery Disease     F/V Dx: Coronary artery disease involving native coronary artery of native heart without angina pectoris   • Atrial Fibrillation     F/V Dx: AF (atrial fibrillation) ([facility])       Subjective:   Suzie Mireles is a 80 y.o. female who presents today for follow-up of atrial fibrillation managed with sotalol and apixaban to good effect.  She currently is in sinus rhythm.  She also has underlying CAD and follows with a cardiologist in [place] for many years as well.  She negates she has had an echocardiogram although these results are unavailable.  She feels she was told it was normal.  She has no exertional symptoms and has not had no issues with paroxysmal of atrial fibrillation or stroke.  She has no bleeding issues.    Since last visit she continues her sotalol without issue and her anticoagulation is uncomplicated.  She feels well.    Past Medical History:   Diagnosis Date   • Asthma    • Atrial fibrillation (HCC)    • Bronchiectasis (HCC)    • Cardiac arrhythmia    • Chickenpox    • Constipation    • COPD (chronic obstructive pulmonary disease) (HCC)    • Cough    • Diarrhea    • GERD (gastroesophageal reflux disease)    • [place] measles    • Heart attack (HCC)    • Hypertension    • Influenza    • Insomnia    • Peripheral vascular disease (HCC)    • Rheumatic fever    • Sleep apnea    • Sputum production    • Wears glasses    • Weight loss    • Wheezing      Past Surgical History:   Procedure Laterality Date   • CARPAL TUNNEL RELEASE     • HYSTERECTOMY LAPAROSCOPY     • LAMINOTOMY     • TONSILLECTOMY       Family History   Problem Relation Age of Onset   • Anemia Neg Hx    •  Asthma Neg Hx      Social History     Socioeconomic History   • Marital status:      Spouse name: Not on file   • Number of children: Not on file   • Years of education: Not on file   • Highest education level: Not on file   Occupational History   • Not on file   Tobacco Use   • Smoking status: Former Smoker     Packs/day: 1.00     Years: 36.00     Pack years: 36.00     Types: Cigarettes     Quit date:      Years since quittin.2   • Smokeless tobacco: Never Used   Substance and Sexual Activity   • Alcohol use: Never   • Drug use: Never   • Sexual activity: Not on file   Other Topics Concern   • Not on file   Social History Narrative   • Not on file     Social Determinants of Health     Financial Resource Strain:    • Difficulty of Paying Living Expenses:    Food Insecurity:    • Worried About Running Out of Food in the Last Year:    • Ran Out of Food in the Last Year:    Transportation Needs:    • Lack of Transportation (Medical):    • Lack of Transportation (Non-Medical):    Physical Activity:    • Days of Exercise per Week:    • Minutes of Exercise per Session:    Stress:    • Feeling of Stress :    Social Connections:    • Frequency of Communication with Friends and Family:    • Frequency of Social Gatherings with Friends and Family:    • Attends Congregational Services:    • Active Member of Clubs or Organizations:    • Attends Club or Organization Meetings:    • Marital Status:    Intimate Partner Violence:    • Fear of Current or Ex-Partner:    • Emotionally Abused:    • Physically Abused:    • Sexually Abused:      No Known Allergies  Outpatient Encounter Medications as of 3/26/2021   Medication Sig Dispense Refill   • sotalol (BETAPACE) 80 MG Tab Take 1 tablet by mouth 2 times a day. Please schedule a follow up appointment for further refills. Thank you! 180 tablet 0   • budesonide (PULMICORT) 0.25 MG/2ML Suspension Take 2 mL by nebulization 2 times a day for 90 days. 180 mL 3   • Arformoterol  "Tartrate (BROVANA) 15 MCG/2ML Nebu Soln Take 2 mL by nebulization 2 Times a Day for 90 days. 1 vial via nebulizer twice a day. 180 mL 3   • Verapamil HCl  MG CAPSULE SR 24 HR Take 1 Cap by mouth every day. 90 Cap 4   • apixaban (ELIQUIS) 5mg Tab Take 1 Tab by mouth 2 Times a Day. 180 Tab 4   • Cyanocobalamin (B-12) 2000 MCG Tab Take 2,500 mcg by mouth 1 time daily as needed.     • hydroCHLOROthiazide (HYDRODIURIL) 25 MG Tab Take 1 Tab by mouth every day. 90 Tab 3   • losartan (COZAAR) 50 MG Tab Take 1 Tab by mouth every day. 90 Tab 3   • nitroglycerin (NITROSTAT) 0.4 MG SL Tab Place 1 Tab under tongue as needed for Chest Pain. 25 Tab 3   • pravastatin (PRAVACHOL) 40 MG tablet Take 1 Tab by mouth every day. 90 Tab 3   • albuterol (PROAIR HFA) 108 (90 Base) MCG/ACT Aero Soln inhalation aerosol Inhale 2 Puffs by mouth every four hours as needed for Shortness of Breath. 3 Each 3   • XIIDRA 5 % Solution      • Multiple Vitamins-Minerals (ICAPS AREDS 2) Cap Take  by mouth every day.     • NON SPECIFIED Avastin shot for Macular     • levothyroxine (SYNTHROID) 125 MCG Tab Take 125 mcg by mouth Every morning on an empty stomach.     • omeprazole (PRILOSEC) 20 MG Tablet Delayed Response delayed-release tablet Take 20 mg by mouth every day.     • Cholecalciferol (VITAMIN D3) 5000 units Tab Take 25 mcg by mouth every day.     • Arformoterol Tartrate 15 MCG/2ML Nebu Soln Inhale 15 mcg by mouth 2 Times a Day.     • IRON PO Take 65 mg by mouth.     • albuterol 108 (90 Base) MCG/ACT Aero Soln inhalation aerosol Inhale  by mouth.       No facility-administered encounter medications on file as of 3/26/2021.     Review of Systems   All other systems reviewed and are negative.       Objective:   /78 (BP Location: Left arm, Patient Position: Sitting, BP Cuff Size: Adult)   Pulse (!) 56   Ht 1.575 m (5' 2\")   Wt 88.9 kg (196 lb)   SpO2 95%   BMI 35.85 kg/m²     Physical Exam   Constitutional: She is oriented to person, " place, and time. She appears well-developed and well-nourished. No distress.   HENT:   Head: Normocephalic and atraumatic.   Mouth/Throat: Oropharynx is clear and moist. No oropharyngeal exudate.   Eyes: Pupils are equal, round, and reactive to light. Conjunctivae and EOM are normal. No scleral icterus.   Neck: No JVD present.   Pulmonary/Chest: Effort normal. No stridor. No respiratory distress.   Musculoskeletal:         General: No edema.   Neurological: She is alert and oriented to person, place, and time. No cranial nerve deficit.   Skin: Skin is dry. No rash noted. She is not diaphoretic. No erythema. No pallor.   Psychiatric: She has a normal mood and affect. Her behavior is normal. Judgment and thought content normal.   Nursing note and vitals reviewed.  No significant change in physical exam since prior 3/11/2020     LABS:  Lab Results   Component Value Date/Time    CHOLSTRLTOT 167 09/14/2020 06:24 AM    LDL 82 09/14/2020 06:24 AM    HDL 64 09/14/2020 06:24 AM    TRIGLYCERIDE 103 09/14/2020 06:24 AM       Lab Results   Component Value Date/Time    WBC 6.7 03/17/2021 11:02 AM    RBC 4.55 03/17/2021 11:02 AM    HEMOGLOBIN 13.0 03/17/2021 11:02 AM    HEMATOCRIT 41.7 03/17/2021 11:02 AM    MCV 91.6 03/17/2021 11:02 AM    NEUTSPOLYS 56.20 03/17/2021 11:02 AM    LYMPHOCYTES 30.20 03/17/2021 11:02 AM    MONOCYTES 9.50 03/17/2021 11:02 AM    EOSINOPHILS 3.40 03/17/2021 11:02 AM    BASOPHILS 0.40 03/17/2021 11:02 AM     Lab Results   Component Value Date/Time    SODIUM 138 09/30/2020 01:23 PM    POTASSIUM 4.3 09/30/2020 01:23 PM    CHLORIDE 99 09/30/2020 01:23 PM    CO2 26 09/30/2020 01:23 PM    GLUCOSE 94 09/30/2020 01:23 PM    BUN 16 09/30/2020 01:23 PM    CREATININE 0.64 09/30/2020 01:23 PM     Lab Results   Component Value Date    HBA1C 5.6 03/13/2020      Lab Results   Component Value Date/Time    ALKPHOSPHAT 77 09/30/2020 01:23 PM    ASTSGOT 13 09/30/2020 01:23 PM    ALTSGPT 9 09/30/2020 01:23 PM    TBILIRUBIN  0.5 09/30/2020 01:23 PM      No results found for: BNPBTYPENAT   No results found for: TSH  No results found for: PROTHROMBTM, INR     EKG (9/17/2020):  I have personally reviewed the EKG this visit and discussed with the patient.  Sinus bradycardia.   ms.  QTc 4 and 73 ms.    ECHO CONCLUSIONS (8/21/2020):  No prior study is available for comparison.   Normal left ventricular size and systolic function.  Left ventricular ejection fraction is visually estimated to be 60%.  Mild concentric left ventricular hypertrophy.  Mild mitral regurgitation.  Estimated right ventricular systolic pressure  is 35 mmHg.    EKG (11/12/2019):  I have personally reviewed the EKG this visit and discussed with the patient.  Sinus rhythm nonspecific ST-T wave changes.        Assessment:     1. Longstanding persistent atrial fibrillation (HCC)     2. High risk medication use     3. Atrial fibrillation, unspecified type (HCC)     4. Essential hypertension, benign     5. Coronary artery disease involving native coronary artery of native heart without angina pectoris  Lipid Profile    Comp Metabolic Panel   6. Anticoagulated     7. Iliac artery stenosis, bilateral (HCC)         Medical Decision Making:  Today's Assessment / Status / Plan:     Overall doing well tolerating sotalol and anticoagulation well.  She is maintaining sinus rhythm the majority of the time from a symptom standpoint.  She is due for lipid profile to assess the changes made at last visit.  DAYLIN and lower extremity ultrasound showed excellent patency of her revascularization in her legs.    1.  Continue statin for goal LDL less than 70  2.  Lipid profile metabolic panel  3.  Continue oral anticoagulation  4.  Continue sotalol  5.  Continue other medical therapy    Follow-up in 6 months              No Recipients

## 2021-03-26 NOTE — PROGRESS NOTES
Chief Complaint   Patient presents with   • Coronary Artery Disease     F/V Dx: Coronary artery disease involving native coronary artery of native heart without angina pectoris   • Atrial Fibrillation     F/V Dx: AF (atrial fibrillation) (Formerly [facility])       Subjective:   Suzie Mireles is a 80 y.o. female who presents today for follow-up of atrial fibrillation managed with sotalol and apixaban to good effect.  She currently is in sinus rhythm.  She also has underlying CAD and follows with a cardiologist in [place] for many years as well.  She negates she has had an echocardiogram although these results are unavailable.  She feels she was told it was normal.  She has no exertional symptoms and has not had no issues with paroxysmal of atrial fibrillation or stroke.  She has no bleeding issues.    Since last visit she continues her sotalol without issue and her anticoagulation is uncomplicated.  She feels well.    Past Medical History:   Diagnosis Date   • Asthma    • Atrial fibrillation (Formerly [facility])    • Bronchiectasis (Formerly [facility])    • Cardiac arrhythmia    • Chickenpox    • Constipation    • COPD (chronic obstructive pulmonary disease) (Formerly [facility])    • Cough    • Diarrhea    • GERD (gastroesophageal reflux disease)    • Serbian measles    • Heart attack (Formerly [facility])    • Hypertension    • Influenza    • Insomnia    • Peripheral vascular disease (Formerly [facility])    • Rheumatic fever    • Sleep apnea    • Sputum production    • Wears glasses    • Weight loss    • Wheezing      Past Surgical History:   Procedure Laterality Date   • CARPAL TUNNEL RELEASE     • HYSTERECTOMY LAPAROSCOPY     • LAMINOTOMY     • TONSILLECTOMY       Family History   Problem Relation Age of Onset   • Anemia Neg Hx    • Asthma Neg Hx      Social History     Socioeconomic History   • Marital status:      Spouse name: Not on file   • Number of children: Not on file   • Years of education: Not on file   • Highest education level: Not on file   Occupational History   • Not on file    Tobacco Use   • Smoking status: Former Smoker     Packs/day: 1.00     Years: 36.00     Pack years: 36.00     Types: Cigarettes     Quit date:      Years since quittin.2   • Smokeless tobacco: Never Used   Substance and Sexual Activity   • Alcohol use: Never   • Drug use: Never   • Sexual activity: Not on file   Other Topics Concern   • Not on file   Social History Narrative   • Not on file     Social Determinants of Health     Financial Resource Strain:    • Difficulty of Paying Living Expenses:    Food Insecurity:    • Worried About Running Out of Food in the Last Year:    • Ran Out of Food in the Last Year:    Transportation Needs:    • Lack of Transportation (Medical):    • Lack of Transportation (Non-Medical):    Physical Activity:    • Days of Exercise per Week:    • Minutes of Exercise per Session:    Stress:    • Feeling of Stress :    Social Connections:    • Frequency of Communication with Friends and Family:    • Frequency of Social Gatherings with Friends and Family:    • Attends Confucianist Services:    • Active Member of Clubs or Organizations:    • Attends Club or Organization Meetings:    • Marital Status:    Intimate Partner Violence:    • Fear of Current or Ex-Partner:    • Emotionally Abused:    • Physically Abused:    • Sexually Abused:      No Known Allergies  Outpatient Encounter Medications as of 3/26/2021   Medication Sig Dispense Refill   • sotalol (BETAPACE) 80 MG Tab Take 1 tablet by mouth 2 times a day. Please schedule a follow up appointment for further refills. Thank you! 180 tablet 0   • budesonide (PULMICORT) 0.25 MG/2ML Suspension Take 2 mL by nebulization 2 times a day for 90 days. 180 mL 3   • Arformoterol Tartrate (BROVANA) 15 MCG/2ML Nebu Soln Take 2 mL by nebulization 2 Times a Day for 90 days. 1 vial via nebulizer twice a day. 180 mL 3   • Verapamil HCl  MG CAPSULE SR 24 HR Take 1 Cap by mouth every day. 90 Cap 4   • apixaban (ELIQUIS) 5mg Tab Take 1 Tab by mouth  "2 Times a Day. 180 Tab 4   • Cyanocobalamin (B-12) 2000 MCG Tab Take 2,500 mcg by mouth 1 time daily as needed.     • hydroCHLOROthiazide (HYDRODIURIL) 25 MG Tab Take 1 Tab by mouth every day. 90 Tab 3   • losartan (COZAAR) 50 MG Tab Take 1 Tab by mouth every day. 90 Tab 3   • nitroglycerin (NITROSTAT) 0.4 MG SL Tab Place 1 Tab under tongue as needed for Chest Pain. 25 Tab 3   • pravastatin (PRAVACHOL) 40 MG tablet Take 1 Tab by mouth every day. 90 Tab 3   • albuterol (PROAIR HFA) 108 (90 Base) MCG/ACT Aero Soln inhalation aerosol Inhale 2 Puffs by mouth every four hours as needed for Shortness of Breath. 3 Each 3   • XIIDRA 5 % Solution      • Multiple Vitamins-Minerals (ICAPS AREDS 2) Cap Take  by mouth every day.     • NON SPECIFIED Avastin shot for Macular     • levothyroxine (SYNTHROID) 125 MCG Tab Take 125 mcg by mouth Every morning on an empty stomach.     • omeprazole (PRILOSEC) 20 MG Tablet Delayed Response delayed-release tablet Take 20 mg by mouth every day.     • Cholecalciferol (VITAMIN D3) 5000 units Tab Take 25 mcg by mouth every day.     • Arformoterol Tartrate 15 MCG/2ML Nebu Soln Inhale 15 mcg by mouth 2 Times a Day.     • IRON PO Take 65 mg by mouth.     • albuterol 108 (90 Base) MCG/ACT Aero Soln inhalation aerosol Inhale  by mouth.       No facility-administered encounter medications on file as of 3/26/2021.     Review of Systems   All other systems reviewed and are negative.       Objective:   /78 (BP Location: Left arm, Patient Position: Sitting, BP Cuff Size: Adult)   Pulse (!) 56   Ht 1.575 m (5' 2\")   Wt 88.9 kg (196 lb)   SpO2 95%   BMI 35.85 kg/m²     Physical Exam   Constitutional: She is oriented to person, place, and time. She appears well-developed and well-nourished. No distress.   HENT:   Head: Normocephalic and atraumatic.   Mouth/Throat: Oropharynx is clear and moist. No oropharyngeal exudate.   Eyes: Pupils are equal, round, and reactive to light. Conjunctivae and EOM " are normal. No scleral icterus.   Neck: No JVD present.   Pulmonary/Chest: Effort normal. No stridor. No respiratory distress.   Musculoskeletal:         General: No edema.   Neurological: She is alert and oriented to person, place, and time. No cranial nerve deficit.   Skin: Skin is dry. No rash noted. She is not diaphoretic. No erythema. No pallor.   Psychiatric: She has a normal mood and affect. Her behavior is normal. Judgment and thought content normal.   Nursing note and vitals reviewed.  No significant change in physical exam since prior 3/11/2020     LABS:  Lab Results   Component Value Date/Time    CHOLSTRLTOT 167 09/14/2020 06:24 AM    LDL 82 09/14/2020 06:24 AM    HDL 64 09/14/2020 06:24 AM    TRIGLYCERIDE 103 09/14/2020 06:24 AM       Lab Results   Component Value Date/Time    WBC 6.7 03/17/2021 11:02 AM    RBC 4.55 03/17/2021 11:02 AM    HEMOGLOBIN 13.0 03/17/2021 11:02 AM    HEMATOCRIT 41.7 03/17/2021 11:02 AM    MCV 91.6 03/17/2021 11:02 AM    NEUTSPOLYS 56.20 03/17/2021 11:02 AM    LYMPHOCYTES 30.20 03/17/2021 11:02 AM    MONOCYTES 9.50 03/17/2021 11:02 AM    EOSINOPHILS 3.40 03/17/2021 11:02 AM    BASOPHILS 0.40 03/17/2021 11:02 AM     Lab Results   Component Value Date/Time    SODIUM 138 09/30/2020 01:23 PM    POTASSIUM 4.3 09/30/2020 01:23 PM    CHLORIDE 99 09/30/2020 01:23 PM    CO2 26 09/30/2020 01:23 PM    GLUCOSE 94 09/30/2020 01:23 PM    BUN 16 09/30/2020 01:23 PM    CREATININE 0.64 09/30/2020 01:23 PM     Lab Results   Component Value Date    HBA1C 5.6 03/13/2020      Lab Results   Component Value Date/Time    ALKPHOSPHAT 77 09/30/2020 01:23 PM    ASTSGOT 13 09/30/2020 01:23 PM    ALTSGPT 9 09/30/2020 01:23 PM    TBILIRUBIN 0.5 09/30/2020 01:23 PM      No results found for: BNPBTYPENAT   No results found for: TSH  No results found for: PROTHROMBTM, INR     EKG (9/17/2020):  I have personally reviewed the EKG this visit and discussed with the patient.  Sinus bradycardia.   ms.  QTc 4  and 73 ms.    ECHO CONCLUSIONS (8/21/2020):  No prior study is available for comparison.   Normal left ventricular size and systolic function.  Left ventricular ejection fraction is visually estimated to be 60%.  Mild concentric left ventricular hypertrophy.  Mild mitral regurgitation.  Estimated right ventricular systolic pressure  is 35 mmHg.    EKG (11/12/2019):  I have personally reviewed the EKG this visit and discussed with the patient.  Sinus rhythm nonspecific ST-T wave changes.        Assessment:     1. Longstanding persistent atrial fibrillation ([facility])     2. High risk medication use     3. Atrial fibrillation, unspecified type ([facility])     4. Essential hypertension, benign     5. Coronary artery disease involving native coronary artery of native heart without angina pectoris  Lipid Profile    Comp Metabolic Panel   6. Anticoagulated     7. Iliac artery stenosis, bilateral ([facility])         Medical Decision Making:  Today's Assessment / Status / Plan:     Overall doing well tolerating sotalol and anticoagulation well.  She is maintaining sinus rhythm the majority of the time from a symptom standpoint.  She is due for lipid profile to assess the changes made at last visit.  DAYLIN and lower extremity ultrasound showed excellent patency of her revascularization in her legs.    1.  Continue statin for goal LDL less than 70  2.  Lipid profile metabolic panel  3.  Continue oral anticoagulation  4.  Continue sotalol  5.  Continue other medical therapy    Follow-up in 6 months

## 2021-04-12 ENCOUNTER — HOSPITAL ENCOUNTER (OUTPATIENT)
Dept: LAB | Facility: MEDICAL CENTER | Age: 81
End: 2021-04-12
Attending: INTERNAL MEDICINE
Payer: MEDICARE

## 2021-04-12 DIAGNOSIS — I25.10 CORONARY ARTERY DISEASE INVOLVING NATIVE CORONARY ARTERY OF NATIVE HEART WITHOUT ANGINA PECTORIS: ICD-10-CM

## 2021-04-12 LAB
ALBUMIN SERPL BCP-MCNC: 4 G/DL (ref 3.2–4.9)
ALBUMIN/GLOB SERPL: 1.3 G/DL
ALP SERPL-CCNC: 71 U/L (ref 30–99)
ALT SERPL-CCNC: 10 U/L (ref 2–50)
ANION GAP SERPL CALC-SCNC: 9 MMOL/L (ref 7–16)
AST SERPL-CCNC: 15 U/L (ref 12–45)
BILIRUB SERPL-MCNC: 0.5 MG/DL (ref 0.1–1.5)
BUN SERPL-MCNC: 19 MG/DL (ref 8–22)
CALCIUM SERPL-MCNC: 9.5 MG/DL (ref 8.5–10.5)
CHLORIDE SERPL-SCNC: 102 MMOL/L (ref 96–112)
CHOLEST SERPL-MCNC: 157 MG/DL (ref 100–199)
CO2 SERPL-SCNC: 27 MMOL/L (ref 20–33)
CREAT SERPL-MCNC: 0.7 MG/DL (ref 0.5–1.4)
FASTING STATUS PATIENT QL REPORTED: NORMAL
GLOBULIN SER CALC-MCNC: 3.2 G/DL (ref 1.9–3.5)
GLUCOSE SERPL-MCNC: 100 MG/DL (ref 65–99)
HDLC SERPL-MCNC: 55 MG/DL
LDLC SERPL CALC-MCNC: 81 MG/DL
POTASSIUM SERPL-SCNC: 4.6 MMOL/L (ref 3.6–5.5)
PROT SERPL-MCNC: 7.2 G/DL (ref 6–8.2)
SODIUM SERPL-SCNC: 138 MMOL/L (ref 135–145)
TRIGL SERPL-MCNC: 105 MG/DL (ref 0–149)

## 2021-04-12 PROCEDURE — 80061 LIPID PANEL: CPT

## 2021-04-12 PROCEDURE — 36415 COLL VENOUS BLD VENIPUNCTURE: CPT

## 2021-04-12 PROCEDURE — 80053 COMPREHEN METABOLIC PANEL: CPT

## 2021-05-13 ENCOUNTER — TELEPHONE (OUTPATIENT)
Dept: CARDIOLOGY | Facility: MEDICAL CENTER | Age: 81
End: 2021-05-13

## 2021-05-13 NOTE — TELEPHONE ENCOUNTER
TW    Patient called and needs a refill of her sotalol (BETAPACE) 80 MG Tab. Please send to pharmacy on file.    Thank you,    Shawna RITTER

## 2021-05-14 DIAGNOSIS — I48.11 LONGSTANDING PERSISTENT ATRIAL FIBRILLATION (HCC): ICD-10-CM

## 2021-05-14 DIAGNOSIS — Z79.899 HIGH RISK MEDICATION USE: ICD-10-CM

## 2021-05-14 RX ORDER — SOTALOL HYDROCHLORIDE 80 MG/1
80 TABLET ORAL 2 TIMES DAILY
Qty: 180 TABLET | Refills: 1 | Status: SHIPPED | OUTPATIENT
Start: 2021-05-14 | End: 2021-07-02 | Stop reason: SDUPTHER

## 2021-06-23 ENCOUNTER — PATIENT MESSAGE (OUTPATIENT)
Dept: SLEEP MEDICINE | Facility: MEDICAL CENTER | Age: 81
End: 2021-06-23

## 2021-06-23 DIAGNOSIS — J43.1 PANLOBULAR EMPHYSEMA (HCC): ICD-10-CM

## 2021-06-23 RX ORDER — ARFORMOTEROL TARTRATE 15 UG/2ML
15 SOLUTION RESPIRATORY (INHALATION) 2 TIMES DAILY
Qty: 360 ML | Refills: 0 | Status: SHIPPED | OUTPATIENT
Start: 2021-06-23 | End: 2021-08-30

## 2021-06-23 RX ORDER — BUDESONIDE 0.25 MG/2ML
250 INHALANT ORAL 2 TIMES DAILY
Qty: 180 ML | Refills: 3 | Status: SHIPPED | OUTPATIENT
Start: 2021-06-23 | End: 2021-11-29

## 2021-06-23 NOTE — TELEPHONE ENCOUNTER
From: Suzie Mireles  To: Physician Susan Medley  Sent: 6/23/2021 9:40 AM PDT  Subject: Prescription Question    I need both Brovana Inh Solution 2ml 15mcg/2 and Budesonide Inh Susp 30's 0.25/2m renewed.  I usually get 3mo. supply with 4 refills. It is less expensive for me this way and it is ordered from [facility].  Thank you in advance.

## 2021-07-02 ENCOUNTER — OFFICE VISIT (OUTPATIENT)
Dept: CARDIOLOGY | Facility: MEDICAL CENTER | Age: 81
End: 2021-07-02
Payer: MEDICARE

## 2021-07-02 VITALS
DIASTOLIC BLOOD PRESSURE: 58 MMHG | HEART RATE: 58 BPM | OXYGEN SATURATION: 98 % | WEIGHT: 205 LBS | BODY MASS INDEX: 37.73 KG/M2 | SYSTOLIC BLOOD PRESSURE: 134 MMHG | HEIGHT: 62 IN

## 2021-07-02 DIAGNOSIS — E78.2 MIXED HYPERLIPIDEMIA: ICD-10-CM

## 2021-07-02 DIAGNOSIS — Z79.899 HIGH RISK MEDICATION USE: ICD-10-CM

## 2021-07-02 DIAGNOSIS — Z79.01 ANTICOAGULATED: ICD-10-CM

## 2021-07-02 DIAGNOSIS — J43.1 PANLOBULAR EMPHYSEMA (HCC): ICD-10-CM

## 2021-07-02 DIAGNOSIS — I10 ESSENTIAL HYPERTENSION, BENIGN: ICD-10-CM

## 2021-07-02 DIAGNOSIS — I48.11 LONGSTANDING PERSISTENT ATRIAL FIBRILLATION (HCC): ICD-10-CM

## 2021-07-02 PROCEDURE — 99214 OFFICE O/P EST MOD 30 MIN: CPT | Performed by: INTERNAL MEDICINE

## 2021-07-02 RX ORDER — SOTALOL HYDROCHLORIDE 80 MG/1
80 TABLET ORAL 2 TIMES DAILY
Qty: 180 TABLET | Refills: 3 | Status: SHIPPED | OUTPATIENT
Start: 2021-07-02

## 2021-07-02 ASSESSMENT — FIBROSIS 4 INDEX: FIB4 SCORE: 1.57

## 2021-07-02 NOTE — PROGRESS NOTES
Chief Complaint   Patient presents with   • Coronary Artery Disease     follow up r ankle swelling x 1 week       Subjective:   Suzie Mireles is a 81 y.o. female who presents today for follow-up of atrial fibrillation managed with sotalol and apixaban to good effect.  She currently is in sinus rhythm.  She also has underlying CAD and follows with a cardiologist in [place] for many years as well.  She negates she has had an echocardiogram although these results are unavailable.  She feels she was told it was normal.  She has no exertional symptoms and has not had no issues with paroxysmal of atrial fibrillation or stroke.  She has no bleeding issues.    Solitary brief episode of atrial fibrillation very rare occurrence and spontaneous reversion to sinus rhythm.  Tolerating sotalol and anticoagulation well.  Notes right lower ankle swelling.  Notes that she has progressive neuropathy in her feet.    Past Medical History:   Diagnosis Date   • Asthma    • Atrial fibrillation (HCC)    • Bronchiectasis (HCC)    • Cardiac arrhythmia    • Chickenpox    • Constipation    • COPD (chronic obstructive pulmonary disease) ([facility])    • Cough    • Diarrhea    • GERD (gastroesophageal reflux disease)    • Iranian measles    • Heart attack (HCC)    • Hypertension    • Influenza    • Insomnia    • Peripheral vascular disease (HCC)    • Rheumatic fever    • Sleep apnea    • Sputum production    • Wears glasses    • Weight loss    • Wheezing      Past Surgical History:   Procedure Laterality Date   • CARPAL TUNNEL RELEASE     • HYSTERECTOMY LAPAROSCOPY     • LAMINOTOMY     • TONSILLECTOMY       Family History   Problem Relation Age of Onset   • Anemia Neg Hx    • Asthma Neg Hx      Social History     Socioeconomic History   • Marital status:      Spouse name: Not on file   • Number of children: Not on file   • Years of education: Not on file   • Highest education level: Not on file   Occupational History   • Not on file    Tobacco Use   • Smoking status: Former Smoker     Packs/day: 1.00     Years: 36.00     Pack years: 36.00     Types: Cigarettes     Quit date:      Years since quittin.5   • Smokeless tobacco: Never Used   Vaping Use   • Vaping Use: Never used   Substance and Sexual Activity   • Alcohol use: Never   • Drug use: Never   • Sexual activity: Not on file   Other Topics Concern   • Not on file   Social History Narrative   • Not on file     Social Determinants of Health     Financial Resource Strain:    • Difficulty of Paying Living Expenses:    Food Insecurity:    • Worried About Running Out of Food in the Last Year:    • Ran Out of Food in the Last Year:    Transportation Needs:    • Lack of Transportation (Medical):    • Lack of Transportation (Non-Medical):    Physical Activity:    • Days of Exercise per Week:    • Minutes of Exercise per Session:    Stress:    • Feeling of Stress :    Social Connections:    • Frequency of Communication with Friends and Family:    • Frequency of Social Gatherings with Friends and Family:    • Attends Islam Services:    • Active Member of Clubs or Organizations:    • Attends Club or Organization Meetings:    • Marital Status:    Intimate Partner Violence:    • Fear of Current or Ex-Partner:    • Emotionally Abused:    • Physically Abused:    • Sexually Abused:      No Known Allergies  Outpatient Encounter Medications as of 2021   Medication Sig Dispense Refill   • sotalol (BETAPACE) 80 MG Tab Take 1 tablet by mouth 2 times a day. 180 tablet 3   • budesonide (PULMICORT) 0.25 MG/2ML Suspension Take 2 mL by nebulization 2 times a day for 90 days. 180 mL 3   • Arformoterol Tartrate (BROVANA) 15 MCG/2ML Nebu Soln Take 2 mL by nebulization 2 times a day for 90 days. 1 vial via nebulizer twice a day. 360 mL 0   • Verapamil HCl  MG CAPSULE SR 24 HR Take 1 Cap by mouth every day. 90 Cap 4   • apixaban (ELIQUIS) 5mg Tab Take 1 Tab by mouth 2 Times a Day. 180 Tab 4   •  "Cyanocobalamin (B-12) 2000 MCG Tab Take 2,500 mcg by mouth every day.     • hydroCHLOROthiazide (HYDRODIURIL) 25 MG Tab Take 1 Tab by mouth every day. 90 Tab 3   • losartan (COZAAR) 50 MG Tab Take 1 Tab by mouth every day. 90 Tab 3   • nitroglycerin (NITROSTAT) 0.4 MG SL Tab Place 1 Tab under tongue as needed for Chest Pain. 25 Tab 3   • pravastatin (PRAVACHOL) 40 MG tablet Take 1 Tab by mouth every day. 90 Tab 3   • albuterol (PROAIR HFA) 108 (90 Base) MCG/ACT Aero Soln inhalation aerosol Inhale 2 Puffs by mouth every four hours as needed for Shortness of Breath. 3 Each 3   • XIIDRA 5 % Solution      • IRON PO Take 65 mg by mouth every 48 hours.     • Multiple Vitamins-Minerals (ICAPS AREDS 2) Cap Take  by mouth every day.     • NON SPECIFIED Avastin shot for Macular     • levothyroxine (SYNTHROID) 125 MCG Tab Take 125 mcg by mouth Every morning on an empty stomach.     • omeprazole (PRILOSEC) 20 MG Tablet Delayed Response delayed-release tablet Take 20 mg by mouth every day.     • Cholecalciferol (VITAMIN D3) 5000 units Tab Take 25 mcg by mouth every day.     • Arformoterol Tartrate 15 MCG/2ML Nebu Soln Inhale 15 mcg by mouth 2 Times a Day.     • [DISCONTINUED] sotalol (BETAPACE) 80 MG Tab Take 1 tablet by mouth 2 times a day. 180 tablet 1   • albuterol 108 (90 Base) MCG/ACT Aero Soln inhalation aerosol Inhale  by mouth.       No facility-administered encounter medications on file as of 7/2/2021.     Review of Systems   All other systems reviewed and are negative.       Objective:   /58 (BP Location: Left arm, Patient Position: Sitting)   Pulse (!) 58   Ht 1.575 m (5' 2\")   Wt 93 kg (205 lb)   SpO2 98%   BMI 37.49 kg/m²     Physical Exam   Constitutional: She is oriented to person, place, and time. She appears well-developed. No distress.   HENT:   Head: Normocephalic and atraumatic.   Right Ear: External ear normal.   Left Ear: External ear normal.   Mouth/Throat: No oropharyngeal exudate.   Eyes: " Pupils are equal, round, and reactive to light. Conjunctivae are normal. Right eye exhibits no discharge. Left eye exhibits no discharge. No scleral icterus.   Neck: No JVD present. No tracheal deviation present. No thyromegaly present.   Cardiovascular: Normal rate, regular rhythm, S1 normal, S2 normal and normal heart sounds. PMI is not displaced. Exam reveals no gallop, no S3, no S4 and no friction rub.   No murmur heard.  Pulses:       Carotid pulses are 2+ on the right side and 2+ on the left side.       Radial pulses are 2+ on the right side and 2+ on the left side.        Popliteal pulses are 2+ on the right side and 2+ on the left side.        Dorsalis pedis pulses are 2+ on the right side and 2+ on the left side.        Posterior tibial pulses are 2+ on the right side and 2+ on the left side.   Pulmonary/Chest: Effort normal and breath sounds normal. No respiratory distress. She has no wheezes. She has no rales. She exhibits no tenderness.   Abdominal: Soft. Bowel sounds are normal. She exhibits no distension. There is no abdominal tenderness.   Musculoskeletal:         General: No tenderness. Normal range of motion.      Cervical back: Normal range of motion and neck supple.      Right lower leg: Edema ( Pitting edema 1+ right ankle) present.   Neurological: She is alert and oriented to person, place, and time. No cranial nerve deficit (Cranial nerves II through XII grossly intact).   Skin: Skin is warm and dry. No rash noted. She is not diaphoretic. No erythema.   Psychiatric: Her behavior is normal. Judgment and thought content normal.   Vitals reviewed.  No significant change in physical exam since prior 3/11/2020     LABS:  Lab Results   Component Value Date/Time    CHOLSTRLTOT 157 04/12/2021 10:22 AM    LDL 81 04/12/2021 10:22 AM    HDL 55 04/12/2021 10:22 AM    TRIGLYCERIDE 105 04/12/2021 10:22 AM       Lab Results   Component Value Date/Time    WBC 6.7 03/17/2021 11:02 AM    RBC 4.55 03/17/2021  11:02 AM    HEMOGLOBIN 13.0 03/17/2021 11:02 AM    HEMATOCRIT 41.7 03/17/2021 11:02 AM    MCV 91.6 03/17/2021 11:02 AM    NEUTSPOLYS 56.20 03/17/2021 11:02 AM    LYMPHOCYTES 30.20 03/17/2021 11:02 AM    MONOCYTES 9.50 03/17/2021 11:02 AM    EOSINOPHILS 3.40 03/17/2021 11:02 AM    BASOPHILS 0.40 03/17/2021 11:02 AM     Lab Results   Component Value Date/Time    SODIUM 138 04/12/2021 10:22 AM    POTASSIUM 4.6 04/12/2021 10:22 AM    CHLORIDE 102 04/12/2021 10:22 AM    CO2 27 04/12/2021 10:22 AM    GLUCOSE 100 (H) 04/12/2021 10:22 AM    BUN 19 04/12/2021 10:22 AM    CREATININE 0.70 04/12/2021 10:22 AM     Lab Results   Component Value Date    HBA1C 5.6 03/13/2020      Lab Results   Component Value Date/Time    ALKPHOSPHAT 71 04/12/2021 10:22 AM    ASTSGOT 15 04/12/2021 10:22 AM    ALTSGPT 10 04/12/2021 10:22 AM    TBILIRUBIN 0.5 04/12/2021 10:22 AM      No results found for: BNPBTYPENAT   No results found for: TSH  No results found for: PROTHROMBTM, INR     EKG (9/17/2020):  I have personally reviewed the EKG this visit and discussed with the patient.  Sinus bradycardia.   ms.  QTc 4 and 73 ms.    ECHO CONCLUSIONS (8/21/2020):  No prior study is available for comparison.   Normal left ventricular size and systolic function.  Left ventricular ejection fraction is visually estimated to be 60%.  Mild concentric left ventricular hypertrophy.  Mild mitral regurgitation.  Estimated right ventricular systolic pressure  is 35 mmHg.    EKG (11/12/2019):  I have personally reviewed the EKG this visit and discussed with the patient.  Sinus rhythm nonspecific ST-T wave changes.        Assessment:     1. Longstanding persistent atrial fibrillation (HCC)  sotalol (BETAPACE) 80 MG Tab   2. High risk medication use  sotalol (BETAPACE) 80 MG Tab   3. Essential hypertension, benign     4. Anticoagulated     5. Mixed hyperlipidemia     6. Panlobular emphysema (HCC)         Medical Decision Making:  Today's Assessment / Status /  Plan:     Overall doing well tolerating sotalol and anticoagulation well.  She is maintaining sinus rhythm the majority of the time from a symptom standpoint.  Her edema on her right ankle could be cardiac however her echocardiogram last year was relatively benign and there are no changes on physical exam.  She may also be developing a Charcot type joint on her right foot due to neuropathy which could account for the unilateral swelling.  Venous insufficiency is also possible due to varicosities.  Conservative treatment with compression garment for ankle stabilization and swelling.  Offered echocardiogram, she would like to defer unless it progresses or does not resolve.    1.  Continue statin for goal LDL less than 70  2.  Compression garment to right ankle  3.  Continue oral anticoagulation  4.  Continue sotalol  5.  Continue other medical therapy    Follow-up in 6 months

## 2021-07-14 ENCOUNTER — HOSPITAL ENCOUNTER (OUTPATIENT)
Dept: LAB | Facility: MEDICAL CENTER | Age: 81
End: 2021-07-14
Attending: PHYSICIAN ASSISTANT
Payer: MEDICARE

## 2021-07-14 LAB
ALBUMIN SERPL BCP-MCNC: 3.9 G/DL (ref 3.2–4.9)
ALBUMIN/GLOB SERPL: 1.3 G/DL
ALP SERPL-CCNC: 65 U/L (ref 30–99)
ALT SERPL-CCNC: 11 U/L (ref 2–50)
ANION GAP SERPL CALC-SCNC: 10 MMOL/L (ref 7–16)
AST SERPL-CCNC: 14 U/L (ref 12–45)
BASOPHILS # BLD AUTO: 0.5 % (ref 0–1.8)
BASOPHILS # BLD: 0.03 K/UL (ref 0–0.12)
BILIRUB SERPL-MCNC: 0.3 MG/DL (ref 0.1–1.5)
BUN SERPL-MCNC: 20 MG/DL (ref 8–22)
CALCIUM SERPL-MCNC: 9.2 MG/DL (ref 8.5–10.5)
CHLORIDE SERPL-SCNC: 103 MMOL/L (ref 96–112)
CHOLEST SERPL-MCNC: 143 MG/DL (ref 100–199)
CO2 SERPL-SCNC: 28 MMOL/L (ref 20–33)
CREAT SERPL-MCNC: 0.74 MG/DL (ref 0.5–1.4)
CREAT UR-MCNC: 79.31 MG/DL
EOSINOPHIL # BLD AUTO: 0.17 K/UL (ref 0–0.51)
EOSINOPHIL NFR BLD: 3 % (ref 0–6.9)
ERYTHROCYTE [DISTWIDTH] IN BLOOD BY AUTOMATED COUNT: 46.4 FL (ref 35.9–50)
FASTING STATUS PATIENT QL REPORTED: NORMAL
GLOBULIN SER CALC-MCNC: 2.9 G/DL (ref 1.9–3.5)
GLUCOSE SERPL-MCNC: 102 MG/DL (ref 65–99)
HCT VFR BLD AUTO: 39.2 % (ref 37–47)
HDLC SERPL-MCNC: 58 MG/DL
HGB BLD-MCNC: 12.4 G/DL (ref 12–16)
IMM GRANULOCYTES # BLD AUTO: 0.02 K/UL (ref 0–0.11)
IMM GRANULOCYTES NFR BLD AUTO: 0.3 % (ref 0–0.9)
LDLC SERPL CALC-MCNC: 65 MG/DL
LYMPHOCYTES # BLD AUTO: 1.69 K/UL (ref 1–4.8)
LYMPHOCYTES NFR BLD: 29.5 % (ref 22–41)
MCH RBC QN AUTO: 29.2 PG (ref 27–33)
MCHC RBC AUTO-ENTMCNC: 31.6 G/DL (ref 33.6–35)
MCV RBC AUTO: 92.5 FL (ref 81.4–97.8)
MICROALBUMIN UR-MCNC: <1.2 MG/DL
MICROALBUMIN/CREAT UR: NORMAL MG/G (ref 0–30)
MONOCYTES # BLD AUTO: 0.61 K/UL (ref 0–0.85)
MONOCYTES NFR BLD AUTO: 10.6 % (ref 0–13.4)
NEUTROPHILS # BLD AUTO: 3.21 K/UL (ref 2–7.15)
NEUTROPHILS NFR BLD: 56.1 % (ref 44–72)
NRBC # BLD AUTO: 0 K/UL
NRBC BLD-RTO: 0 /100 WBC
PLATELET # BLD AUTO: 218 K/UL (ref 164–446)
PMV BLD AUTO: 11.2 FL (ref 9–12.9)
POTASSIUM SERPL-SCNC: 4.2 MMOL/L (ref 3.6–5.5)
PROT SERPL-MCNC: 6.8 G/DL (ref 6–8.2)
RBC # BLD AUTO: 4.24 M/UL (ref 4.2–5.4)
SODIUM SERPL-SCNC: 141 MMOL/L (ref 135–145)
TRIGL SERPL-MCNC: 98 MG/DL (ref 0–149)
TSH SERPL DL<=0.005 MIU/L-ACNC: 2.39 UIU/ML (ref 0.38–5.33)
WBC # BLD AUTO: 5.7 K/UL (ref 4.8–10.8)

## 2021-07-14 PROCEDURE — 84443 ASSAY THYROID STIM HORMONE: CPT

## 2021-07-14 PROCEDURE — 36415 COLL VENOUS BLD VENIPUNCTURE: CPT

## 2021-07-14 PROCEDURE — 82570 ASSAY OF URINE CREATININE: CPT

## 2021-07-14 PROCEDURE — 80053 COMPREHEN METABOLIC PANEL: CPT

## 2021-07-14 PROCEDURE — 82043 UR ALBUMIN QUANTITATIVE: CPT

## 2021-07-14 PROCEDURE — 80061 LIPID PANEL: CPT

## 2021-07-14 PROCEDURE — 85025 COMPLETE CBC W/AUTO DIFF WBC: CPT

## 2021-08-04 DIAGNOSIS — I25.10 CORONARY ARTERY DISEASE INVOLVING NATIVE CORONARY ARTERY OF NATIVE HEART WITHOUT ANGINA PECTORIS: ICD-10-CM

## 2021-08-04 DIAGNOSIS — E78.2 MIXED HYPERLIPIDEMIA: ICD-10-CM

## 2021-08-07 RX ORDER — PRAVASTATIN SODIUM 40 MG
40 TABLET ORAL DAILY
Qty: 90 TABLET | Refills: 3 | Status: SHIPPED | OUTPATIENT
Start: 2021-08-07

## 2021-08-22 DIAGNOSIS — I10 ESSENTIAL HYPERTENSION, BENIGN: ICD-10-CM

## 2021-08-23 RX ORDER — HYDROCHLOROTHIAZIDE 25 MG/1
TABLET ORAL
Qty: 90 TABLET | Refills: 3 | Status: SHIPPED | OUTPATIENT
Start: 2021-08-23

## 2021-08-23 RX ORDER — LOSARTAN POTASSIUM 50 MG/1
TABLET ORAL
Qty: 90 TABLET | Refills: 3 | Status: SHIPPED | OUTPATIENT
Start: 2021-08-23

## 2021-08-30 DIAGNOSIS — J43.1 PANLOBULAR EMPHYSEMA (HCC): ICD-10-CM

## 2021-08-30 RX ORDER — ARFORMOTEROL TARTRATE 15 UG/2ML
SOLUTION RESPIRATORY (INHALATION)
Qty: 360 ML | Refills: 2 | Status: SHIPPED | OUTPATIENT
Start: 2021-08-30

## 2021-08-30 NOTE — TELEPHONE ENCOUNTER
Have we ever prescribed this med? Yes.  If yes, what date? 06/23/21    Last OV: 02/25/21 with Dr Medley   Return in about 6 months (around 8/25/2021).      Next OV: No Pending appt.     DX: Panlobular emphysema (HCC) (J43.1)    Medications:   Requested Prescriptions     Pending Prescriptions Disp Refills   • BROVANA 15 MCG/2ML Thelma Collins [Pharmacy Med Name: BROVANA INH SOLUTION 2ML 15MCG/2ML] 360 mL 3     Sig: USE 2 ML (1 VIAL) BY NEBULIZATION TWICE A DAY   '

## 2021-10-01 ENCOUNTER — HOSPITAL ENCOUNTER (OUTPATIENT)
Dept: LAB | Facility: MEDICAL CENTER | Age: 81
End: 2021-10-01
Attending: INTERNAL MEDICINE
Payer: MEDICARE

## 2021-10-01 LAB
ALBUMIN SERPL BCP-MCNC: 4.2 G/DL (ref 3.2–4.9)
ALBUMIN/GLOB SERPL: 1.3 G/DL
ALP SERPL-CCNC: 72 U/L (ref 30–99)
ALT SERPL-CCNC: 7 U/L (ref 2–50)
ANION GAP SERPL CALC-SCNC: 12 MMOL/L (ref 7–16)
AST SERPL-CCNC: 16 U/L (ref 12–45)
BILIRUB SERPL-MCNC: 0.4 MG/DL (ref 0.1–1.5)
BUN SERPL-MCNC: 16 MG/DL (ref 8–22)
CALCIUM SERPL-MCNC: 9.6 MG/DL (ref 8.5–10.5)
CHLORIDE SERPL-SCNC: 101 MMOL/L (ref 96–112)
CO2 SERPL-SCNC: 26 MMOL/L (ref 20–33)
CREAT SERPL-MCNC: 0.68 MG/DL (ref 0.5–1.4)
ERYTHROCYTE [DISTWIDTH] IN BLOOD BY AUTOMATED COUNT: 45.8 FL (ref 35.9–50)
FERRITIN SERPL-MCNC: 33.1 NG/ML (ref 10–291)
GLOBULIN SER CALC-MCNC: 3.2 G/DL (ref 1.9–3.5)
GLUCOSE SERPL-MCNC: 99 MG/DL (ref 65–99)
HCT VFR BLD AUTO: 40.1 % (ref 37–47)
HGB BLD-MCNC: 13 G/DL (ref 12–16)
IRON SATN MFR SERPL: 11 % (ref 15–55)
IRON SERPL-MCNC: 43 UG/DL (ref 40–170)
MCH RBC QN AUTO: 29.3 PG (ref 27–33)
MCHC RBC AUTO-ENTMCNC: 32.4 G/DL (ref 33.6–35)
MCV RBC AUTO: 90.5 FL (ref 81.4–97.8)
PLATELET # BLD AUTO: 224 K/UL (ref 164–446)
PMV BLD AUTO: 11.1 FL (ref 9–12.9)
POTASSIUM SERPL-SCNC: 4.2 MMOL/L (ref 3.6–5.5)
PROT SERPL-MCNC: 7.4 G/DL (ref 6–8.2)
RBC # BLD AUTO: 4.43 M/UL (ref 4.2–5.4)
SODIUM SERPL-SCNC: 139 MMOL/L (ref 135–145)
TIBC SERPL-MCNC: 378 UG/DL (ref 250–450)
UIBC SERPL-MCNC: 335 UG/DL (ref 110–370)
VIT B12 SERPL-MCNC: 2899 PG/ML (ref 211–911)
WBC # BLD AUTO: 5.3 K/UL (ref 4.8–10.8)

## 2021-10-01 PROCEDURE — 80053 COMPREHEN METABOLIC PANEL: CPT

## 2021-10-01 PROCEDURE — 83550 IRON BINDING TEST: CPT | Mod: GA

## 2021-10-01 PROCEDURE — 83540 ASSAY OF IRON: CPT | Mod: GA

## 2021-10-01 PROCEDURE — 82607 VITAMIN B-12: CPT

## 2021-10-01 PROCEDURE — 82728 ASSAY OF FERRITIN: CPT | Mod: GA

## 2021-10-01 PROCEDURE — 85027 COMPLETE CBC AUTOMATED: CPT

## 2021-10-01 PROCEDURE — 36415 COLL VENOUS BLD VENIPUNCTURE: CPT

## 2021-10-03 ENCOUNTER — HOSPITAL ENCOUNTER (OUTPATIENT)
Facility: MEDICAL CENTER | Age: 81
End: 2021-10-03
Attending: INTERNAL MEDICINE
Payer: MEDICARE

## 2021-10-03 PROCEDURE — 87329 GIARDIA AG IA: CPT

## 2021-10-03 PROCEDURE — 87899 AGENT NOS ASSAY W/OPTIC: CPT | Mod: 91

## 2021-10-03 PROCEDURE — 83630 LACTOFERRIN FECAL (QUAL): CPT

## 2021-10-03 PROCEDURE — 87493 C DIFF AMPLIFIED PROBE: CPT

## 2021-10-03 PROCEDURE — 87045 FECES CULTURE AEROBIC BACT: CPT

## 2021-10-03 PROCEDURE — 87328 CRYPTOSPORIDIUM AG IA: CPT

## 2021-10-04 LAB
C DIFF DNA SPEC QL NAA+PROBE: NEGATIVE
C DIFF TOX GENS STL QL NAA+PROBE: NEGATIVE
G LAMBLIA+C PARVUM AG STL QL RAPID: NORMAL
SIGNIFICANT IND 70042: NORMAL
SITE SITE: NORMAL
SOURCE SOURCE: NORMAL

## 2021-10-05 LAB
E COLI SXT1+2 STL IA: NORMAL
SIGNIFICANT IND 70042: NORMAL
SITE SITE: NORMAL
SOURCE SOURCE: NORMAL

## 2021-10-06 LAB
BACTERIA STL CULT: ABNORMAL
C JEJUNI+C COLI AG STL QL: ABNORMAL
E COLI SXT1+2 STL IA: ABNORMAL
LACTOFERRIN STL QL IA: NEGATIVE
SIGNIFICANT IND 70042: ABNORMAL
SITE SITE: ABNORMAL
SOURCE SOURCE: ABNORMAL

## 2021-11-28 DIAGNOSIS — J43.1 PANLOBULAR EMPHYSEMA (HCC): ICD-10-CM

## 2021-11-29 RX ORDER — BUDESONIDE 0.25 MG/2ML
INHALANT ORAL
Qty: 180 ML | Refills: 1 | Status: SHIPPED | OUTPATIENT
Start: 2021-11-29

## 2021-11-29 NOTE — TELEPHONE ENCOUNTER
Have we ever prescribed this med? Yes.  If yes, what date? 06/23/21    Last OV: 02/25/21 with Dr Medley   Return in about 6 months (around 8/25/2021).      Next OV: No Pending appt.     DX: Panlobular emphysema (HCC) (J43.1)    Medications:   Requested Prescriptions     Pending Prescriptions Disp Refills   • budesonide (PULMICORT) 0.25 MG/2ML Suspension [Pharmacy Med Name: BUDESONIDE INH SUSP 30'S 0.25/2M] 180 mL 7     Sig: USE 2 ML (1 VIAL)  BY NEBULIZATION TWICE A DAY

## 2022-11-07 ENCOUNTER — PATIENT MESSAGE (OUTPATIENT)
Dept: HEALTH INFORMATION MANAGEMENT | Facility: OTHER | Age: 82
End: 2022-11-07

## 2023-08-08 ENCOUNTER — APPOINTMENT (RX ONLY)
Dept: URBAN - METROPOLITAN AREA CLINIC 22 | Facility: CLINIC | Age: 83
Setting detail: DERMATOLOGY
End: 2023-08-08

## 2023-08-08 DIAGNOSIS — L57.0 ACTINIC KERATOSIS: ICD-10-CM

## 2023-08-08 DIAGNOSIS — L82.1 OTHER SEBORRHEIC KERATOSIS: ICD-10-CM

## 2023-08-08 DIAGNOSIS — D18.0 HEMANGIOMA: ICD-10-CM

## 2023-08-08 DIAGNOSIS — D22 MELANOCYTIC NEVI: ICD-10-CM

## 2023-08-08 DIAGNOSIS — L81.4 OTHER MELANIN HYPERPIGMENTATION: ICD-10-CM

## 2023-08-08 DIAGNOSIS — L82.0 INFLAMED SEBORRHEIC KERATOSIS: ICD-10-CM

## 2023-08-08 DIAGNOSIS — Z71.89 OTHER SPECIFIED COUNSELING: ICD-10-CM

## 2023-08-08 PROBLEM — D22.5 MELANOCYTIC NEVI OF TRUNK: Status: ACTIVE | Noted: 2023-08-08

## 2023-08-08 PROBLEM — D18.01 HEMANGIOMA OF SKIN AND SUBCUTANEOUS TISSUE: Status: ACTIVE | Noted: 2023-08-08

## 2023-08-08 PROCEDURE — ? COUNSELING

## 2023-08-08 PROCEDURE — 99213 OFFICE O/P EST LOW 20 MIN: CPT | Mod: 25

## 2023-08-08 PROCEDURE — 17000 DESTRUCT PREMALG LESION: CPT | Mod: 59

## 2023-08-08 PROCEDURE — ? LIQUID NITROGEN

## 2023-08-08 PROCEDURE — 17110 DESTRUCTION B9 LES UP TO 14: CPT

## 2023-08-08 ASSESSMENT — LOCATION DETAILED DESCRIPTION DERM
LOCATION DETAILED: LEFT ANTERIOR SHOULDER
LOCATION DETAILED: RIGHT POPLITEAL SKIN
LOCATION DETAILED: LEFT VENTRAL PROXIMAL FOREARM
LOCATION DETAILED: RIGHT LATERAL UPPER BACK
LOCATION DETAILED: RIGHT LATERAL TRAPEZIAL NECK
LOCATION DETAILED: RIGHT ANTERIOR SHOULDER
LOCATION DETAILED: LOWER STERNUM
LOCATION DETAILED: LEFT POSTERIOR SHOULDER
LOCATION DETAILED: RIGHT SUPERIOR UPPER BACK
LOCATION DETAILED: SUPERIOR THORACIC SPINE
LOCATION DETAILED: LEFT CENTRAL TEMPLE
LOCATION DETAILED: RIGHT CENTRAL TEMPLE
LOCATION DETAILED: RIGHT VENTRAL PROXIMAL FOREARM
LOCATION DETAILED: INFERIOR MID FOREHEAD
LOCATION DETAILED: INFERIOR THORACIC SPINE

## 2023-08-08 ASSESSMENT — LOCATION SIMPLE DESCRIPTION DERM
LOCATION SIMPLE: UPPER BACK
LOCATION SIMPLE: RIGHT POPLITEAL SKIN
LOCATION SIMPLE: LEFT SHOULDER
LOCATION SIMPLE: INFERIOR FOREHEAD
LOCATION SIMPLE: LEFT TEMPLE
LOCATION SIMPLE: RIGHT UPPER BACK
LOCATION SIMPLE: RIGHT TEMPLE
LOCATION SIMPLE: LEFT FOREARM
LOCATION SIMPLE: RIGHT SHOULDER
LOCATION SIMPLE: RIGHT FOREARM
LOCATION SIMPLE: POSTERIOR NECK
LOCATION SIMPLE: CHEST

## 2023-08-08 ASSESSMENT — LOCATION ZONE DERM
LOCATION ZONE: ARM
LOCATION ZONE: TRUNK
LOCATION ZONE: FACE
LOCATION ZONE: LEG
LOCATION ZONE: NECK

## 2023-08-08 NOTE — PROCEDURE: LIQUID NITROGEN
Render Post-Care Instructions In Note?: no
Show Applicator Variable?: Yes
Detail Level: Detailed
Medical Necessity Information: It is in your best interest to select a reason for this procedure from the list below. All of these items fulfill various CMS LCD requirements except the new and changing color options.
Consent: The patient's consent was obtained including but not limited to risks of crusting, scabbing, blistering, scarring, darker or lighter pigmentary change, recurrence, incomplete removal and infection.
Medical Necessity Clause: This procedure was medically necessary because the lesions that were treated were:
Spray Paint Text: The liquid nitrogen was applied to the skin utilizing a spray paint frosting technique.
Post-Care Instructions: I reviewed with the patient in detail post-care instructions. Patient is to wear sunprotection, and avoid picking at any of the treated lesions. Pt may apply Vaseline to crusted or scabbing areas.
Number Of Freeze-Thaw Cycles: 2 freeze-thaw cycles
Duration Of Freeze Thaw-Cycle (Seconds): 0

## 2023-11-29 ENCOUNTER — PATIENT MESSAGE (OUTPATIENT)
Dept: HEALTH INFORMATION MANAGEMENT | Facility: OTHER | Age: 83
End: 2023-11-29

## 2024-09-25 ENCOUNTER — APPOINTMENT (RX ONLY)
Dept: URBAN - METROPOLITAN AREA CLINIC 22 | Facility: CLINIC | Age: 84
Setting detail: DERMATOLOGY
End: 2024-09-25

## 2024-09-25 DIAGNOSIS — Z71.89 OTHER SPECIFIED COUNSELING: ICD-10-CM

## 2024-09-25 DIAGNOSIS — D22 MELANOCYTIC NEVI: ICD-10-CM

## 2024-09-25 DIAGNOSIS — L81.4 OTHER MELANIN HYPERPIGMENTATION: ICD-10-CM

## 2024-09-25 DIAGNOSIS — L82.1 OTHER SEBORRHEIC KERATOSIS: ICD-10-CM

## 2024-09-25 DIAGNOSIS — D18.0 HEMANGIOMA: ICD-10-CM

## 2024-09-25 DIAGNOSIS — L82.0 INFLAMED SEBORRHEIC KERATOSIS: ICD-10-CM

## 2024-09-25 PROBLEM — D22.5 MELANOCYTIC NEVI OF TRUNK: Status: ACTIVE | Noted: 2024-09-25

## 2024-09-25 PROBLEM — D18.01 HEMANGIOMA OF SKIN AND SUBCUTANEOUS TISSUE: Status: ACTIVE | Noted: 2024-09-25

## 2024-09-25 PROCEDURE — 17110 DESTRUCTION B9 LES UP TO 14: CPT

## 2024-09-25 PROCEDURE — ? LIQUID NITROGEN (COSMETIC)

## 2024-09-25 PROCEDURE — ? SUNSCREEN RECOMMENDATIONS

## 2024-09-25 PROCEDURE — 99213 OFFICE O/P EST LOW 20 MIN: CPT | Mod: 25

## 2024-09-25 PROCEDURE — ? LIQUID NITROGEN

## 2024-09-25 PROCEDURE — ? COUNSELING

## 2024-09-25 ASSESSMENT — LOCATION SIMPLE DESCRIPTION DERM
LOCATION SIMPLE: INFERIOR FOREHEAD
LOCATION SIMPLE: RIGHT UPPER BACK
LOCATION SIMPLE: UPPER BACK
LOCATION SIMPLE: CHEST
LOCATION SIMPLE: LOWER BACK
LOCATION SIMPLE: RIGHT FOREARM
LOCATION SIMPLE: RIGHT CHEEK
LOCATION SIMPLE: LEFT PRETIBIAL REGION
LOCATION SIMPLE: LEFT CHEEK
LOCATION SIMPLE: ABDOMEN
LOCATION SIMPLE: RIGHT POSTERIOR UPPER ARM
LOCATION SIMPLE: LEFT FOREARM

## 2024-09-25 ASSESSMENT — LOCATION DETAILED DESCRIPTION DERM
LOCATION DETAILED: LEFT INFERIOR MEDIAL MALAR CHEEK
LOCATION DETAILED: EPIGASTRIC SKIN
LOCATION DETAILED: RIGHT INFERIOR MEDIAL UPPER BACK
LOCATION DETAILED: LEFT DISTAL PRETIBIAL REGION
LOCATION DETAILED: INFERIOR MID FOREHEAD
LOCATION DETAILED: SUPERIOR LUMBAR SPINE
LOCATION DETAILED: LOWER STERNUM
LOCATION DETAILED: LEFT VENTRAL PROXIMAL FOREARM
LOCATION DETAILED: RIGHT SUPERIOR UPPER BACK
LOCATION DETAILED: RIGHT INFERIOR MEDIAL MALAR CHEEK
LOCATION DETAILED: RIGHT PROXIMAL POSTERIOR UPPER ARM
LOCATION DETAILED: RIGHT VENTRAL PROXIMAL FOREARM
LOCATION DETAILED: PERIUMBILICAL SKIN
LOCATION DETAILED: SUPERIOR THORACIC SPINE
LOCATION DETAILED: SUBXIPHOID

## 2024-09-25 ASSESSMENT — LOCATION ZONE DERM
LOCATION ZONE: ARM
LOCATION ZONE: TRUNK
LOCATION ZONE: FACE
LOCATION ZONE: LEG

## 2024-09-25 NOTE — PROCEDURE: SUNSCREEN RECOMMENDATIONS

## 2024-09-25 NOTE — PROCEDURE: LIQUID NITROGEN (COSMETIC)
English
Consent: The patient's consent was obtained including but not limited to risks of crusting, scabbing, blistering, scarring, darker or lighter pigmentary change, recurrence, incomplete removal and infection. The patient understands that the procedure is cosmetic in nature and is not covered by insurance.\\nNo charge today
Spray Paint Text: The liquid nitrogen was applied to the skin utilizing a spray paint frosting technique.
Render Post-Care Instructions In Note?: no
Detail Level: Detailed
Price (Use Numbers Only, No Special Characters Or $): 0
Post-Care Instructions: I reviewed with the patient in detail post-care instructions. Patient is to wear sunprotection, and avoid picking at any of the treated lesions. Pt may apply Vaseline to crusted or scabbing areas.
Show Spray Paint Technique Variable?: Yes
Billing Information: Bill by Static Price

## 2024-09-25 NOTE — PROCEDURE: LIQUID NITROGEN
Add 52 Modifier (Optional): no
Show Applicator Variable?: Yes
Spray Paint Text: The liquid nitrogen was applied to the skin utilizing a spray paint frosting technique.
Consent: The patient's consent was obtained including but not limited to risks of crusting, scabbing, blistering, scarring, darker or lighter pigmentary change, recurrence, incomplete removal and infection.
Medical Necessity Information: It is in your best interest to select a reason for this procedure from the list below. All of these items fulfill various CMS LCD requirements except the new and changing color options.
Medical Necessity Clause: This procedure was medically necessary because the lesions that were treated were:
Number Of Freeze-Thaw Cycles: 2 freeze-thaw cycles
Detail Level: Detailed
Post-Care Instructions: I reviewed with the patient in detail post-care instructions. Patient is to wear sunprotection, and avoid picking at any of the treated lesions. Pt may apply Vaseline to crusted or scabbing areas.